# Patient Record
Sex: MALE | Race: WHITE | Employment: OTHER | URBAN - METROPOLITAN AREA
[De-identification: names, ages, dates, MRNs, and addresses within clinical notes are randomized per-mention and may not be internally consistent; named-entity substitution may affect disease eponyms.]

---

## 2017-03-03 ENCOUNTER — APPOINTMENT (OUTPATIENT)
Dept: GENERAL RADIOLOGY | Age: 67
End: 2017-03-03
Attending: INTERNAL MEDICINE
Payer: MEDICARE

## 2017-03-03 ENCOUNTER — HOSPITAL ENCOUNTER (OUTPATIENT)
Dept: NON INVASIVE DIAGNOSTICS | Age: 67
Setting detail: OBSERVATION
Discharge: HOME | End: 2017-03-04
Attending: INTERNAL MEDICINE | Admitting: INTERNAL MEDICINE
Payer: MEDICARE

## 2017-03-03 LAB
ANION GAP BLD CALC-SCNC: 9 MMOL/L (ref 5–15)
BUN SERPL-MCNC: 21 MG/DL (ref 6–20)
BUN/CREAT SERPL: 16 (ref 12–20)
CALCIUM SERPL-MCNC: 8.7 MG/DL (ref 8.5–10.1)
CHLORIDE SERPL-SCNC: 109 MMOL/L (ref 97–108)
CO2 SERPL-SCNC: 25 MMOL/L (ref 21–32)
CREAT SERPL-MCNC: 1.31 MG/DL (ref 0.7–1.3)
ERYTHROCYTE [DISTWIDTH] IN BLOOD BY AUTOMATED COUNT: 12.2 % (ref 11.5–14.5)
GLUCOSE SERPL-MCNC: 89 MG/DL (ref 65–100)
HCT VFR BLD AUTO: 41.4 % (ref 36.6–50.3)
HGB BLD-MCNC: 14.4 G/DL (ref 12.1–17)
MCH RBC QN AUTO: 31.9 PG (ref 26–34)
MCHC RBC AUTO-ENTMCNC: 34.8 G/DL (ref 30–36.5)
MCV RBC AUTO: 91.8 FL (ref 80–99)
PLATELET # BLD AUTO: 236 K/UL (ref 150–400)
POTASSIUM SERPL-SCNC: 3.8 MMOL/L (ref 3.5–5.1)
RBC # BLD AUTO: 4.51 M/UL (ref 4.1–5.7)
SODIUM SERPL-SCNC: 143 MMOL/L (ref 136–145)
WBC # BLD AUTO: 6.4 K/UL (ref 4.1–11.1)

## 2017-03-03 PROCEDURE — 74011000250 HC RX REV CODE- 250

## 2017-03-03 PROCEDURE — 74011250636 HC RX REV CODE- 250/636

## 2017-03-03 PROCEDURE — L3670 SO ACRO/CLAV CAN WEB PRE OTS: HCPCS

## 2017-03-03 PROCEDURE — 33208 INSRT HEART PM ATRIAL & VENT: CPT

## 2017-03-03 PROCEDURE — 77030014450 HC INTRO SHTH ANGI MRTM -B

## 2017-03-03 PROCEDURE — C1785 PMKR, DUAL, RATE-RESP: HCPCS

## 2017-03-03 PROCEDURE — 80048 BASIC METABOLIC PNL TOTAL CA: CPT | Performed by: INTERNAL MEDICINE

## 2017-03-03 PROCEDURE — C1894 INTRO/SHEATH, NON-LASER: HCPCS

## 2017-03-03 PROCEDURE — 77030031139 HC SUT VCRL2 J&J -A

## 2017-03-03 PROCEDURE — 71010 XR CHEST PORT: CPT

## 2017-03-03 PROCEDURE — 99218 HC RM OBSERVATION: CPT

## 2017-03-03 PROCEDURE — 74011636320 HC RX REV CODE- 636/320

## 2017-03-03 PROCEDURE — 77030018729 HC ELECTRD DEFIB PAD CARD -B

## 2017-03-03 PROCEDURE — 74011250636 HC RX REV CODE- 250/636: Performed by: INTERNAL MEDICINE

## 2017-03-03 PROCEDURE — 77030002996 HC SUT SLK J&J -A

## 2017-03-03 PROCEDURE — 36415 COLL VENOUS BLD VENIPUNCTURE: CPT | Performed by: INTERNAL MEDICINE

## 2017-03-03 PROCEDURE — 74011250637 HC RX REV CODE- 250/637: Performed by: INTERNAL MEDICINE

## 2017-03-03 PROCEDURE — C1892 INTRO/SHEATH,FIXED,PEEL-AWAY: HCPCS

## 2017-03-03 PROCEDURE — C1898 LEAD, PMKR, OTHER THAN TRANS: HCPCS

## 2017-03-03 PROCEDURE — 77030018836 HC SOL IRR NACL ICUM -A

## 2017-03-03 PROCEDURE — 77010033678 HC OXYGEN DAILY

## 2017-03-03 PROCEDURE — 85027 COMPLETE CBC AUTOMATED: CPT | Performed by: INTERNAL MEDICINE

## 2017-03-03 RX ORDER — CEFAZOLIN SODIUM IN 0.9 % NACL 2 G/100 ML
2 PLASTIC BAG, INJECTION (ML) INTRAVENOUS EVERY 8 HOURS
Status: COMPLETED | OUTPATIENT
Start: 2017-03-03 | End: 2017-03-04

## 2017-03-03 RX ORDER — ATORVASTATIN CALCIUM 20 MG/1
20 TABLET, FILM COATED ORAL DAILY
Status: DISCONTINUED | OUTPATIENT
Start: 2017-03-04 | End: 2017-03-04 | Stop reason: HOSPADM

## 2017-03-03 RX ORDER — BACITRACIN 50000 [IU]/1
50000 INJECTION, POWDER, FOR SOLUTION INTRAMUSCULAR ONCE
Status: COMPLETED | OUTPATIENT
Start: 2017-03-03 | End: 2017-03-03

## 2017-03-03 RX ORDER — BACITRACIN 50000 [IU]/1
INJECTION, POWDER, FOR SOLUTION INTRAMUSCULAR
Status: COMPLETED
Start: 2017-03-03 | End: 2017-03-03

## 2017-03-03 RX ORDER — DICLOFENAC SODIUM 10 MG/G
GEL TOPICAL 4 TIMES DAILY
COMMUNITY

## 2017-03-03 RX ORDER — ACETAMINOPHEN 325 MG/1
650 TABLET ORAL
Status: DISCONTINUED | OUTPATIENT
Start: 2017-03-03 | End: 2017-03-04 | Stop reason: HOSPADM

## 2017-03-03 RX ORDER — HEPARIN SODIUM 200 [USP'U]/100ML
INJECTION, SOLUTION INTRAVENOUS
Status: COMPLETED
Start: 2017-03-03 | End: 2017-03-03

## 2017-03-03 RX ORDER — AMIODARONE HYDROCHLORIDE 200 MG/1
200 TABLET ORAL 2 TIMES DAILY
Status: DISCONTINUED | OUTPATIENT
Start: 2017-03-03 | End: 2017-03-04 | Stop reason: HOSPADM

## 2017-03-03 RX ORDER — FENTANYL CITRATE 50 UG/ML
12.5-5 INJECTION, SOLUTION INTRAMUSCULAR; INTRAVENOUS
Status: DISCONTINUED | OUTPATIENT
Start: 2017-03-03 | End: 2017-03-03 | Stop reason: ALTCHOICE

## 2017-03-03 RX ORDER — SODIUM CHLORIDE 0.9 % (FLUSH) 0.9 %
5-10 SYRINGE (ML) INJECTION EVERY 8 HOURS
Status: DISCONTINUED | OUTPATIENT
Start: 2017-03-03 | End: 2017-03-04 | Stop reason: HOSPADM

## 2017-03-03 RX ORDER — FENTANYL CITRATE 50 UG/ML
INJECTION, SOLUTION INTRAMUSCULAR; INTRAVENOUS
Status: COMPLETED
Start: 2017-03-03 | End: 2017-03-03

## 2017-03-03 RX ORDER — SODIUM CHLORIDE 9 MG/ML
100 INJECTION, SOLUTION INTRAVENOUS CONTINUOUS
Status: DISCONTINUED | OUTPATIENT
Start: 2017-03-03 | End: 2017-03-04 | Stop reason: HOSPADM

## 2017-03-03 RX ORDER — LIDOCAINE HYDROCHLORIDE AND EPINEPHRINE 10; 10 MG/ML; UG/ML
1-20 INJECTION, SOLUTION INFILTRATION; PERINEURAL
Status: DISCONTINUED | OUTPATIENT
Start: 2017-03-03 | End: 2017-03-03 | Stop reason: ALTCHOICE

## 2017-03-03 RX ORDER — CEPHALEXIN 500 MG/1
500 CAPSULE ORAL 3 TIMES DAILY
Qty: 9 CAP | Refills: 0 | Status: SHIPPED | OUTPATIENT
Start: 2017-03-03 | End: 2017-03-06

## 2017-03-03 RX ORDER — HEPARIN SODIUM 200 [USP'U]/100ML
500 INJECTION, SOLUTION INTRAVENOUS ONCE
Status: COMPLETED | OUTPATIENT
Start: 2017-03-03 | End: 2017-03-03

## 2017-03-03 RX ORDER — VALSARTAN 80 MG/1
160 TABLET ORAL DAILY
Status: DISCONTINUED | OUTPATIENT
Start: 2017-03-04 | End: 2017-03-04 | Stop reason: HOSPADM

## 2017-03-03 RX ORDER — SODIUM CHLORIDE 0.9 % (FLUSH) 0.9 %
5-10 SYRINGE (ML) INJECTION AS NEEDED
Status: DISCONTINUED | OUTPATIENT
Start: 2017-03-03 | End: 2017-03-04 | Stop reason: HOSPADM

## 2017-03-03 RX ORDER — LIDOCAINE HYDROCHLORIDE AND EPINEPHRINE 10; 10 MG/ML; UG/ML
INJECTION, SOLUTION INFILTRATION; PERINEURAL
Status: COMPLETED
Start: 2017-03-03 | End: 2017-03-03

## 2017-03-03 RX ORDER — CEFAZOLIN SODIUM 1 G/3ML
INJECTION, POWDER, FOR SOLUTION INTRAMUSCULAR; INTRAVENOUS
Status: COMPLETED
Start: 2017-03-03 | End: 2017-03-03

## 2017-03-03 RX ORDER — MIDAZOLAM HYDROCHLORIDE 1 MG/ML
INJECTION, SOLUTION INTRAMUSCULAR; INTRAVENOUS
Status: COMPLETED
Start: 2017-03-03 | End: 2017-03-03

## 2017-03-03 RX ORDER — CEFAZOLIN SODIUM IN 0.9 % NACL 2 G/100 ML
2 PLASTIC BAG, INJECTION (ML) INTRAVENOUS ONCE
Status: DISCONTINUED | OUTPATIENT
Start: 2017-03-03 | End: 2017-03-03 | Stop reason: ALTCHOICE

## 2017-03-03 RX ORDER — ASPIRIN 81 MG/1
81 TABLET ORAL DAILY
Status: DISCONTINUED | OUTPATIENT
Start: 2017-03-04 | End: 2017-03-04 | Stop reason: HOSPADM

## 2017-03-03 RX ORDER — MIDAZOLAM HYDROCHLORIDE 1 MG/ML
1-5 INJECTION, SOLUTION INTRAMUSCULAR; INTRAVENOUS
Status: DISCONTINUED | OUTPATIENT
Start: 2017-03-03 | End: 2017-03-03 | Stop reason: ALTCHOICE

## 2017-03-03 RX ADMIN — CEFAZOLIN 2 G: 10 INJECTION, POWDER, FOR SOLUTION INTRAVENOUS; PARENTERAL at 20:54

## 2017-03-03 RX ADMIN — LIDOCAINE HYDROCHLORIDE,EPINEPHRINE BITARTRATE 20 ML: 10; .01 INJECTION, SOLUTION INFILTRATION; PERINEURAL at 12:06

## 2017-03-03 RX ADMIN — SODIUM CHLORIDE 100 ML/HR: 900 INJECTION, SOLUTION INTRAVENOUS at 15:38

## 2017-03-03 RX ADMIN — HEPARIN SODIUM 1000 UNITS: 200 INJECTION, SOLUTION INTRAVENOUS at 10:45

## 2017-03-03 RX ADMIN — BACITRACIN 50000 UNITS: 50000 INJECTION, POWDER, FOR SOLUTION INTRAMUSCULAR at 12:37

## 2017-03-03 RX ADMIN — BACITRACIN 50000 UNITS: 5000 INJECTION, POWDER, FOR SOLUTION INTRAMUSCULAR at 12:37

## 2017-03-03 RX ADMIN — MIDAZOLAM HYDROCHLORIDE 2 MG: 1 INJECTION INTRAMUSCULAR; INTRAVENOUS at 10:50

## 2017-03-03 RX ADMIN — IOPAMIDOL 20 ML: 755 INJECTION, SOLUTION INTRAVENOUS at 10:46

## 2017-03-03 RX ADMIN — MIDAZOLAM HYDROCHLORIDE 2 MG: 1 INJECTION INTRAMUSCULAR; INTRAVENOUS at 12:03

## 2017-03-03 RX ADMIN — SODIUM CHLORIDE 100 ML/HR: 900 INJECTION, SOLUTION INTRAVENOUS at 10:30

## 2017-03-03 RX ADMIN — MIDAZOLAM HYDROCHLORIDE 2 MG: 1 INJECTION INTRAMUSCULAR; INTRAVENOUS at 10:45

## 2017-03-03 RX ADMIN — MIDAZOLAM HYDROCHLORIDE 2 MG: 1 INJECTION INTRAMUSCULAR; INTRAVENOUS at 10:40

## 2017-03-03 RX ADMIN — FENTANYL CITRATE 50 MCG: 50 INJECTION, SOLUTION INTRAMUSCULAR; INTRAVENOUS at 12:01

## 2017-03-03 RX ADMIN — SODIUM CHLORIDE 500 ML: 900 INJECTION, SOLUTION INTRAVENOUS at 10:30

## 2017-03-03 RX ADMIN — Medication 10 ML: at 20:54

## 2017-03-03 RX ADMIN — AMIODARONE HYDROCHLORIDE 200 MG: 200 TABLET ORAL at 17:55

## 2017-03-03 RX ADMIN — CEFAZOLIN SODIUM 2000 MG: 1 INJECTION, POWDER, FOR SOLUTION INTRAMUSCULAR; INTRAVENOUS at 11:45

## 2017-03-03 RX ADMIN — LIDOCAINE HYDROCHLORIDE AND EPINEPHRINE 20 ML: 10; 10 INJECTION, SOLUTION INFILTRATION; PERINEURAL at 12:06

## 2017-03-03 RX ADMIN — MIDAZOLAM HYDROCHLORIDE 2 MG: 1 INJECTION INTRAMUSCULAR; INTRAVENOUS at 11:30

## 2017-03-03 RX ADMIN — FENTANYL CITRATE 50 MCG: 50 INJECTION, SOLUTION INTRAMUSCULAR; INTRAVENOUS at 11:30

## 2017-03-03 NOTE — IP AVS SNAPSHOT
Current Discharge Medication List  
  
Take these medications at their scheduled times Dose & Instructions Dispensing Information Comments Morning Noon Evening Bedtime  
 amiodarone 200 mg tablet Commonly known as:  CORDARONE Your next dose is: Today, Tomorrow Other:  ____________ Take  by mouth daily. Refills:  0  
     
   
   
   
  
 aspirin, buffered 81 mg Tab Your next dose is: Today, Tomorrow Other:  ____________ Take  by mouth daily. Refills:  0  
     
   
   
   
  
 atorvastatin 20 mg tablet Commonly known as:  LIPITOR Your next dose is: Today, Tomorrow Other:  ____________ Take  by mouth daily. Refills:  0  
     
   
   
   
  
 cephALEXin 500 mg capsule Commonly known as:  Dene Decree Your next dose is: Today, Tomorrow Other:  ____________ Dose:  500 mg Take 1 Cap by mouth three (3) times daily for 3 days. Quantity:  9 Cap Refills:  0  
     
   
   
   
  
 hydroCHLOROthiazide 12.5 mg tablet Commonly known as:  HYDRODIURIL Your next dose is: Today, Tomorrow Other:  ____________ Dose:  12.5 mg Take 12.5 mg by mouth daily. Refills:  0  
     
   
   
   
  
 losartan 100 mg tablet Commonly known as:  COZAAR Your next dose is: Today, Tomorrow Other:  ____________ Dose:  50 mg Take 50 mg by mouth two (2) times a day. Refills:  0 VOLTAREN 1 % Gel Generic drug:  diclofenac Your next dose is: Today, Tomorrow Other:  ____________ Apply  to affected area four (4) times daily. Refills:  0 Take these medications as directed Dose & Instructions Dispensing Information Comments Morning Noon Evening Bedtime FISH -160-1,000 mg Cap Generic drug:  omega 3-dha-epa-fish oil Your next dose is: Today, Tomorrow Other:  ____________ Take  by mouth. Refills:  0 Where to Get Your Medications Information about where to get these medications is not yet available ! Ask your nurse or doctor about these medications  
  cephALEXin 500 mg capsule

## 2017-03-03 NOTE — IP AVS SNAPSHOT
[facility] 
[phone] Patient: Anamika Yeung MRN: [id number] :1950 You are allergic to the following No active allergies Recent Documentation Height Weight BMI Smoking Status 1.778 m 86.2 kg 27.26 kg/m2 Never Smoker Emergency Contacts Name Discharge Info Relation Home Work Mobile Alisia Last DISCHARGE CAREGIVER [3] Spouse [3] [phone] About your hospitalization You were admitted on:  March 3, 2017 You last received care in the:  [facility] You were discharged on:  2017 Unit phone number:  [phone] Why you were hospitalized Your primary diagnosis was:  Not on File Providers Seen During Your Hospitalizations Provider Role Specialty Primary office phone Parvez Marshall MD Attending Provider Cardiology [phone] Your Primary Care Physician (PCP) Primary Care Physician Office Phone Office Fax [employer] [phone] Follow-up Information Follow up With Details Comments Contact Info Teresita Hogan MD   [address] [place] [address] 
[phone] Current Discharge Medication List  
  
START taking these medications Dose & Instructions Dispensing Information Comments Morning Noon Evening Bedtime  
 cephALEXin 500 mg capsule Commonly known as:  Lise Hides Your next dose is: Today, Tomorrow Other:  _________ Dose:  500 mg Take 1 Cap by mouth three (3) times daily for 3 days. Quantity:  9 Cap Refills:  0 CONTINUE these medications which have NOT CHANGED Dose & Instructions Dispensing Information Comments Morning Noon Evening Bedtime  
 amiodarone 200 mg tablet Commonly known as:  CORDARONE  
   
 Your next dose is: Today, Tomorrow Other:  _________ Take  by mouth daily. Refills:  0  
     
   
   
   
  
 aspirin, buffered 81 mg Tab Your next dose is: Today, Tomorrow Other:  _________ Take  by mouth daily. Refills:  0  
     
   
   
   
  
 atorvastatin 20 mg tablet Commonly known as:  LIPITOR Your next dose is: Today, Tomorrow Other:  _________ Take  by mouth daily. Refills:  0  
     
   
   
   
  
 FISH -160-1,000 mg Cap Generic drug:  omega 3-dha-epa-fish oil Your next dose is: Today, Tomorrow Other:  _________ Take  by mouth. Refills:  0  
     
   
   
   
  
 hydroCHLOROthiazide 12.5 mg tablet Commonly known as:  HYDRODIURIL Your next dose is: Today, Tomorrow Other:  _________ Dose:  12.5 mg Take 12.5 mg by mouth daily. Refills:  0  
     
   
   
   
  
 losartan 100 mg tablet Commonly known as:  COZAAR Your next dose is: Today, Tomorrow Other:  _________ Dose:  50 mg Take 50 mg by mouth two (2) times a day. Refills:  0 VOLTAREN 1 % Gel Generic drug:  diclofenac Your next dose is: Today, Tomorrow Other:  _________ Apply  to affected area four (4) times daily. Refills:  0 Where to Get Your Medications Information on where to get these meds will be given to you by the nurse or doctor. ! Ask your nurse or doctor about these medications  
  cephALEXin 500 mg capsule Discharge Instructions Cardiology Discharge Summary Patient ID: 
Anamika Yeung [id number] 
47 y.o. 
1950 Admit Date: 3/3/2017 Discharge Date: 3/3/2017 Admitting Physician: Parvez Marshall MD  
 
 
 
Patient Instructions:  
 
 
 
Referenced discharge instructions provided by nursing for diet and activity. Follow-up with Dr Justino Mirza in 10 days for pacer check [phone] Signed: 
Romana Cosier, MD 
3/3/2017 
7:21 PM 
DISCHARGE INSTRUCTIONS FOR PATIENTS WITH PACEMAKERS 1. Remember to call for an appointment in 2-4 weeks [phone] to check healing and implant programming. 2. Medic Alert Bracelets are available from your pharmacist to wear at all times if you choose to wear one. 3. Carry your ID card for pacemaker with you at all times. This card will be given to you in the hospital or mailed to you. 4. The pacemaker will bulge slightly under your skin. The bulge will decrease in size over the next few weeks. Please notify the doctor's office if you notice any of the following around your site: A.  A bruise that does not go away. B.  Soreness or yellow, green, or brown drainage from the site. C. Any swelling from the site. D. If you have a fever of 100 degrees or higher that lasts for a few days. INCISION CARE 1.  Leave Steristrips over your site until it starts to fall off, usually in a few weeks. 2.  You may shower after 3 days as long as your incision isnt submerged or directly sprayed upon until well healed. 3.  For comfort, wear loose fitting clothing. 4.  Report any signs of infection, fever, pain, swelling, redness, oozing, or heat at site especially if these symptoms increase after the first 3 to 4 days. ACTIVITY PRECAUTIONS 1. Avoid rough contact with the implant site. 2. No driving for 14 days. 3. Avoid lifting your arm over your head, carrying anything on the affected side, or lifting over 10 pounds for 30 days. For the first 2 days only bend your arm at the elbow. 4. Any extreme activity such as golf, weight lifting or exercise biking should be restricted for 60 days. 5. Do not carry objects by holding them against your implant site. 6.  No shooting rifles or any type of gun with the affected shoulder permanently. SPECIAL PRECAUTIONS 1.  You should avoid all strong magnetic fields, such as arc welding, large transformers, large motors. 2.  You may not have an MRI which uses a strong magnet to take pictures. 3.  Treatments or surgery that requires diathermy or electrocautery should be discussed with your doctor before scheduled. 4. Avoid radio frequency transmitters, including radar. 5. Advise dentist or other medical personnel you see that you have a pacemaker. 6.  Cell phones and microwave oven use is okay. 7.  If you plan to move or take a trip to a new area, the doctor's office will give you a name of a doctor to contact for any problems. ANTIBIOTIC THERAPY During the first 8 weeks after your pacemaker insertion, you may need antibiotics before any dental work or certain tests or operations. Let the dentist or doctor who is caring for you know that you have had an implanted device. You will be given a prescription for a prophylactic antibiotic called cephalexin to take for a few days after your procedure. Discharge Orders None Introducing [facility]! Hector Leblanc introduces [facility] patient portal. Now you can access parts of your medical record, email your doctor's office, and request medication refills online. 1. In your internet browser, go to [email or web address] 2. Click on the First Time User? Click Here link in the Sign In box. You will see the New Member Sign Up page. 3. Enter your [facility] Access Code exactly as it appears below. You will not need to use this code after youve completed the sign-up process. If you do not sign up before the expiration date, you must request a new code. · [facility] Access Code: [email or web address] Expires: 5/31/2017 12:01 PM 
 
4. Enter the last four digits of your Social Security Number (xxxx) and Date of Birth (mm/dd/yyyy) as indicated and click Submit. You will be taken to the next sign-up page. 5. Create a [employer] ID. This will be your [employer] login ID and cannot be changed, so think of one that is secure and easy to remember. 6. Create a [employer] password. You can change your password at any time. 7. Enter your Password Reset Question and Answer. This can be used at a later time if you forget your password. 8. Enter your e-mail address. You will receive e-mail notification when new information is available in [address] 9. Click Sign Up. You can now view and download portions of your medical record. 10. Click the Download Summary menu link to download a portable copy of your medical information. If you have questions, please visit the Frequently Asked Questions section of the [employer] website. Remember, [employer] is NOT to be used for urgent needs. For medical emergencies, dial 911. Now available from your iPhone and Android! General Information Please provide this summary of care documentation to your next provider. Patient Signature:  ____________________________________________________________ Date:  ____________________________________________________________  
  
Jairon Geronimo Provider Signature:  ____________________________________________________________ Date:  ____________________________________________________________

## 2017-03-03 NOTE — PROGRESS NOTES
EP/ End of Procedure/ TRANSFER - OUT REPORT:    Verbal report given to LPM,RN on Anamika Given for routine progression of care       Report consisted of patients Situation, Background, Assessment and   Recommendations(SBAR). Information from the following report(s) SBAR, Kardex, Procedure Summary and MAR was reviewed with the receiving nurse. Opportunity for questions and clarification was provided.

## 2017-03-03 NOTE — PROGRESS NOTES
Cardiac Cath Lab Recovery Arrival Note:      Amy Brewer arrived to Cardiac Cath Lab, Recovery Area. Staff introduced to patient. Patient identifiers verified with NAME and DATE OF BIRTH. Procedure verified with patient. Consent forms reviewed and signed by patient or authorized representative and verified. Allergies verified. Patient and family oriented to department. Patient and family informed of procedure and plan of care. Questions answered with review. Patient prepped for procedure, per orders from physician, prior to arrival.    Patient on cardiac monitor, non-invasive blood pressure, SPO2 monitor. On room air. Patient is A&Ox 3. Patient reports no complaints. Patient in stretcher, in low position, with side rails up, call bell within reach, patient instructed to call if assistance as needed. Patient prep in: [address], [place]. Family in: waiting room.    Prep by: Mer Eller RN

## 2017-03-04 VITALS
SYSTOLIC BLOOD PRESSURE: 145 MMHG | DIASTOLIC BLOOD PRESSURE: 79 MMHG | HEIGHT: 70 IN | WEIGHT: 190 LBS | BODY MASS INDEX: 27.2 KG/M2 | TEMPERATURE: 97.9 F | RESPIRATION RATE: 11 BRPM | OXYGEN SATURATION: 97 % | HEART RATE: 60 BPM

## 2017-03-04 PROCEDURE — 74011250636 HC RX REV CODE- 250/636: Performed by: INTERNAL MEDICINE

## 2017-03-04 PROCEDURE — 93005 ELECTROCARDIOGRAM TRACING: CPT

## 2017-03-04 PROCEDURE — 74011250637 HC RX REV CODE- 250/637: Performed by: INTERNAL MEDICINE

## 2017-03-04 PROCEDURE — 99218 HC RM OBSERVATION: CPT

## 2017-03-04 RX ADMIN — Medication 10 ML: at 05:01

## 2017-03-04 RX ADMIN — ATORVASTATIN CALCIUM 20 MG: 20 TABLET, FILM COATED ORAL at 08:28

## 2017-03-04 RX ADMIN — ACETAMINOPHEN 650 MG: 325 TABLET, FILM COATED ORAL at 02:35

## 2017-03-04 RX ADMIN — VALSARTAN 160 MG: 80 TABLET ORAL at 08:28

## 2017-03-04 RX ADMIN — CEFAZOLIN 2 G: 10 INJECTION, POWDER, FOR SOLUTION INTRAVENOUS; PARENTERAL at 05:01

## 2017-03-04 RX ADMIN — AMIODARONE HYDROCHLORIDE 200 MG: 200 TABLET ORAL at 08:29

## 2017-03-04 RX ADMIN — ASPIRIN 81 MG: 81 TABLET, COATED ORAL at 08:29

## 2017-03-04 NOTE — PROGRESS NOTES
2000  Pt up and ambulating in the reyes with family. Left arm sling intact. Denies pain at this time. 2015 Assessment completed as charted. Ice bag to left chest pacer site, small amount old blood noted on dressing. 0000  Resting quietly in bed, VSS.  0430 VSS, no acute change in assessment.   0700 VSS, pt up in chair, denies pain or discomfort

## 2017-03-04 NOTE — PROCEDURES
[address]       Name:  Atiya Carrion   MR#:  [id number]   :  1950   Account #:  [de-identified]        Date of Adm:  2017       PROCEDURES PERFORMED:   1. Left subclavian venogram.   2. Dual-chamber permanent pacemaker placement. SURGEON: Kyree Gutierrez MD    ANESTHESIA:  Local with concious sedation     INDICATION: Nonreversible symptomatic sick sinus syndrome and AV   node disease. DESCRIPTION OF PROCEDURE: The patient was brought to the EP   lab in a fasting state and after informed consent was obtained. Electrocardiographic and hemodynamic monitoring were performed. Sedation was performed by the nurse who was in constant attendance   throughout the procedure. The patient received 10 mg of IV Versed   and 100 mcg of IV fentanyl for sedation. The attending physician   constant was present during the entire procedure and supervised   sedation closely, which was administered from 10:30 a.m. until 12:30   p.m. Ancef, 2 grams IV was given prior to the procedure. Lidocaine   1% with epinephrine was used to anesthetize the left chest wall implant   site. The pocket was formed in the usual fashion venous access was   obtained using a micropuncture needle. A 9-Latvian stay sheath was   placed in the left subclavian vein. A guidewire was retained within the   sheath and the right ventricular lead was subsequently advanced to   the right ventricular apex under fluoroscopic guidance. After   appropriate parameters were obtained, the lead was screwed in place   in the usual fashion. This was a [employer] model 7742,   59 cm lead, serial [id number]. Following this, the 9-Latvian sheath was   removed and the guidewire was retained.  A 7-Latvian sheath was   placed over the retained guidewire and after removal of the guidewire,   the right atrial lead was advanced to the area of the right atrial   appendage. This was a [employer] model 7741, 52 cm   lead, serial [id number]. Parameters obtained for the ventricular lead   included an R-wave of 14.3 millivolts, impedance of 952 ohms and   pacing threshold of 0.5 volts at 0.5 milliseconds pulse width. For the   atrial lead, parameters revealed a P-wave of 4.3 millivolts, impedance   of 634 ohms and pacing threshold of 0.3 volts at 0.5 milliseconds pulse   width. Following this, the leads were anchored to the pocket floor using   2-0 silk sutures at each anchor sleeve. The pulse generator was then   connected to the leads and placed in the pocket after hemostasis was   confirmed. This was a [employer]   pacemaker, model L33-1, serial [id number]. Vigorous irrigation with   antibiotic solution was then performed in the pacemaker pocket and   the pocket was closed using 2 running 2-0 Vicryl layers with a more   superficial layer of running 4-0 Vicryl in a subcuticular fashion. Final   fluoroscopic check revealed adequate redundancy of the leads and   absence of a pneumothorax. Final settings were in the DDDR mode   with a lower rate limit of 60 and a maximum tracking rate of 130 beats   per minute. PLAN: The patient was to have a followup portable chest x-ray   immediately post-procedure and a pacemaker check the morning after   the procedure. The patient was also to receive IV Keflex post-  procedure and post-discharge followup was to be in approximately 10   days for a routine wound and device check.         MD Andi Chavez   D:  03/03/2017   19:41   T:  03/04/2017   10:40   Job #:  [id number]

## 2017-03-04 NOTE — ROUTINE PROCESS
I have reviewed discharge instructions with the patient. The patient verbalized understanding. The patient's prescriptions were provided and IV was removed, the patient had no questions regarding new medication and/or pacemaker instructions/precautions.

## 2017-03-04 NOTE — CARDIO/PULMONARY
C/P Rehab Note:    Chart Reviewed. Pt admitted for Dual Chamber PPM,(3/3/17). PMH significant for:  1. CAD  2. HTN  Pt is a non smoker. Met with pt who was sitting up in bed, wife at his side. Printed material given and discussed re: pacemakers, pacemaker discharge instructions and the [place] diet. Discussed post pacemaker instructions including: restrictions for the affected arm (no raising the arm above shoulder level, no heavy lifting for 30 days), monitoring for infection, avoiding impacts/pressure to the site, avoiding extreme activities, when to call the doctor, use of cell phones and microwaves and avoiding strong magnetic anderson. Pt and wife  verbalized understanding and without questions.

## 2017-03-04 NOTE — DISCHARGE INSTRUCTIONS
Cardiology Discharge Summary     Patient ID:  Magalie Osborne  [id number]  12 y.o.  1950    Admit Date: 3/3/2017    Discharge Date: 3/3/2017     Admitting Physician: Jae Reid MD         Patient Instructions:         Referenced discharge instructions provided by nursing for diet and activity. Follow-up with Dr Jocy Person in 10 days for pacer check [phone]     Signed:  Jae Reid MD  3/3/2017  7:21 PM  DISCHARGE INSTRUCTIONS FOR PATIENTS WITH PACEMAKERS    1. Remember to call for an appointment in 2-4 weeks [phone] to check healing and implant programming. 2. Medic Alert Bracelets are available from your pharmacist to wear at all times if you choose to wear one. 3. Carry your ID card for pacemaker with you at all times. This card will be given to you in the hospital or mailed to you. 4. The pacemaker will bulge slightly under your skin. The bulge will decrease in size over the next few weeks. Please notify the doctor's office if you notice any of the following around your site:   A.  A bruise that does not go away. B.  Soreness or yellow, green, or brown drainage from the site. C. Any swelling from the site. D. If you have a fever of 100 degrees or higher that lasts for a few days. INCISION CARE       1.  Leave Steristrips over your site until it starts to fall off, usually in a few weeks. 2.  You may shower after 3 days as long as your incision isnt submerged or directly sprayed upon until well healed. 3.  For comfort, wear loose fitting clothing. 4.  Report any signs of infection, fever, pain, swelling, redness, oozing, or heat at site especially if these symptoms increase after the first 3 to 4 days. ACTIVITY PRECAUTIONS     1. Avoid rough contact with the implant site. 2. No driving for 14 days. 3. Avoid lifting your arm over your head, carrying anything on the affected side, or lifting over 10 pounds for 30 days.   For the first 2 days only bend your arm at the elbow. 4. Any extreme activity such as golf, weight lifting or exercise biking should be restricted for 60 days. 5. Do not carry objects by holding them against your implant site. 6.  No shooting rifles or any type of gun with the affected shoulder permanently. SPECIAL PRECAUTIONS     1. You should avoid all strong magnetic fields, such as arc welding, large transformers, large motors. 2.  You may not have an MRI which uses a strong magnet to take pictures. 3.  Treatments or surgery that requires diathermy or electrocautery should be discussed with your doctor before scheduled. 4. Avoid radio frequency transmitters, including radar. 5. Advise dentist or other medical personnel you see that you have a pacemaker. 6.  Cell phones and microwave oven use is okay. 7.  If you plan to move or take a trip to a new area, the doctor's office will give you a name of a doctor to contact for any problems. ANTIBIOTIC THERAPY    During the first 8 weeks after your pacemaker insertion, you may need antibiotics before any dental work or certain tests or operations. Let the dentist or doctor who is caring for you know that you have had an implanted device. You will be given a prescription for a prophylactic antibiotic called cephalexin to take for a few days after your procedure.

## 2017-03-05 LAB
ATRIAL RATE: 62 BPM
CALCULATED P AXIS, ECG09: 63 DEGREES
CALCULATED R AXIS, ECG10: -32 DEGREES
CALCULATED T AXIS, ECG11: 59 DEGREES
DIAGNOSIS, 93000: NORMAL
P-R INTERVAL, ECG05: 196 MS
Q-T INTERVAL, ECG07: 482 MS
QRS DURATION, ECG06: 142 MS
QTC CALCULATION (BEZET), ECG08: 489 MS
VENTRICULAR RATE, ECG03: 62 BPM

## 2020-08-28 ENCOUNTER — HOSPITAL ENCOUNTER (INPATIENT)
Age: 70
LOS: 4 days | Discharge: HOME | DRG: 250 | End: 2020-09-01
Attending: EMERGENCY MEDICINE | Admitting: HOSPITALIST
Payer: MEDICARE

## 2020-08-28 DIAGNOSIS — I20.0 UNSTABLE ANGINA (HCC): Primary | ICD-10-CM

## 2020-08-28 DIAGNOSIS — R07.9 CHEST PAIN, UNSPECIFIED TYPE: ICD-10-CM

## 2020-08-28 LAB
ACT BLD: 340 SECS (ref 79–138)
ACT BLD: 500 SECS (ref 79–138)
ANION GAP SERPL CALC-SCNC: 3 MMOL/L (ref 5–15)
APTT PPP: 31.1 SEC (ref 22.1–32)
ATRIAL RATE: 60 BPM
BUN SERPL-MCNC: 25 MG/DL (ref 6–20)
BUN/CREAT SERPL: 16 (ref 12–20)
CALCIUM SERPL-MCNC: 8 MG/DL (ref 8.5–10.1)
CALCULATED P AXIS, ECG09: 57 DEGREES
CALCULATED R AXIS, ECG10: -13 DEGREES
CALCULATED T AXIS, ECG11: 32 DEGREES
CHLORIDE SERPL-SCNC: 110 MMOL/L (ref 97–108)
CHOLEST SERPL-MCNC: 132 MG/DL
CO2 SERPL-SCNC: 25 MMOL/L (ref 21–32)
CREAT SERPL-MCNC: 1.59 MG/DL (ref 0.7–1.3)
DIAGNOSIS, 93000: NORMAL
ERYTHROCYTE [DISTWIDTH] IN BLOOD BY AUTOMATED COUNT: 12.6 % (ref 11.5–14.5)
EST. AVERAGE GLUCOSE BLD GHB EST-MCNC: 111 MG/DL
GLUCOSE SERPL-MCNC: 147 MG/DL (ref 65–100)
HBA1C MFR BLD: 5.5 % (ref 4–5.6)
HCT VFR BLD AUTO: 37.9 % (ref 36.6–50.3)
HDLC SERPL-MCNC: 51 MG/DL
HDLC SERPL: 2.6 {RATIO} (ref 0–5)
HGB BLD-MCNC: 12.6 G/DL (ref 12.1–17)
INR PPP: 1.1 (ref 0.9–1.1)
LDLC SERPL CALC-MCNC: 71.4 MG/DL (ref 0–100)
LIPID PROFILE,FLP: NORMAL
MCH RBC QN AUTO: 32.1 PG (ref 26–34)
MCHC RBC AUTO-ENTMCNC: 33.2 G/DL (ref 30–36.5)
MCV RBC AUTO: 96.7 FL (ref 80–99)
NRBC # BLD: 0 K/UL (ref 0–0.01)
NRBC BLD-RTO: 0 PER 100 WBC
P-R INTERVAL, ECG05: 230 MS
PLATELET # BLD AUTO: 173 K/UL (ref 150–400)
PMV BLD AUTO: 10.7 FL (ref 8.9–12.9)
POTASSIUM SERPL-SCNC: 3.9 MMOL/L (ref 3.5–5.1)
PROTHROMBIN TIME: 11.7 SEC (ref 9–11.1)
Q-T INTERVAL, ECG07: 506 MS
QRS DURATION, ECG06: 152 MS
QTC CALCULATION (BEZET), ECG08: 506 MS
RBC # BLD AUTO: 3.92 M/UL (ref 4.1–5.7)
SODIUM SERPL-SCNC: 138 MMOL/L (ref 136–145)
THERAPEUTIC RANGE,PTTT: NORMAL SECS (ref 58–77)
TRIGL SERPL-MCNC: 48 MG/DL (ref ?–150)
TROPONIN I SERPL-MCNC: 13.3 NG/ML
VENTRICULAR RATE, ECG03: 60 BPM
VLDLC SERPL CALC-MCNC: 9.6 MG/DL
WBC # BLD AUTO: 6.3 K/UL (ref 4.1–11.1)

## 2020-08-28 PROCEDURE — 83036 HEMOGLOBIN GLYCOSYLATED A1C: CPT

## 2020-08-28 PROCEDURE — 74011250637 HC RX REV CODE- 250/637: Performed by: INTERNAL MEDICINE

## 2020-08-28 PROCEDURE — 74011250636 HC RX REV CODE- 250/636: Performed by: INTERNAL MEDICINE

## 2020-08-28 PROCEDURE — 84484 ASSAY OF TROPONIN QUANT: CPT

## 2020-08-28 PROCEDURE — B2151ZZ FLUOROSCOPY OF LEFT HEART USING LOW OSMOLAR CONTRAST: ICD-10-PCS | Performed by: INTERNAL MEDICINE

## 2020-08-28 PROCEDURE — 74011000636 HC RX REV CODE- 636: Performed by: INTERNAL MEDICINE

## 2020-08-28 PROCEDURE — C1757 CATH, THROMBECTOMY/EMBOLECT: HCPCS | Performed by: INTERNAL MEDICINE

## 2020-08-28 PROCEDURE — 85610 PROTHROMBIN TIME: CPT

## 2020-08-28 PROCEDURE — C1884 EMBOLIZATION PROTECT SYST: HCPCS | Performed by: INTERNAL MEDICINE

## 2020-08-28 PROCEDURE — 77030000299 HC FEMSTP COMP GLD STJU -B: Performed by: INTERNAL MEDICINE

## 2020-08-28 PROCEDURE — 77030016704 HC CATH ANGI DX PRF1 MRTM -B: Performed by: INTERNAL MEDICINE

## 2020-08-28 PROCEDURE — C1769 GUIDE WIRE: HCPCS | Performed by: INTERNAL MEDICINE

## 2020-08-28 PROCEDURE — 85347 COAGULATION TIME ACTIVATED: CPT

## 2020-08-28 PROCEDURE — C1894 INTRO/SHEATH, NON-LASER: HCPCS | Performed by: INTERNAL MEDICINE

## 2020-08-28 PROCEDURE — 74011000258 HC RX REV CODE- 258: Performed by: INTERNAL MEDICINE

## 2020-08-28 PROCEDURE — 77030028837 HC SYR ANGI PWR INJ COEU -A: Performed by: INTERNAL MEDICINE

## 2020-08-28 PROCEDURE — 92937 PRQ TRLUML REVSC CAB GRF 1: CPT | Performed by: INTERNAL MEDICINE

## 2020-08-28 PROCEDURE — 93459 L HRT ART/GRFT ANGIO: CPT | Performed by: INTERNAL MEDICINE

## 2020-08-28 PROCEDURE — 74011000250 HC RX REV CODE- 250: Performed by: INTERNAL MEDICINE

## 2020-08-28 PROCEDURE — C1760 CLOSURE DEV, VASC: HCPCS | Performed by: INTERNAL MEDICINE

## 2020-08-28 PROCEDURE — 99152 MOD SED SAME PHYS/QHP 5/>YRS: CPT | Performed by: INTERNAL MEDICINE

## 2020-08-28 PROCEDURE — 93005 ELECTROCARDIOGRAM TRACING: CPT

## 2020-08-28 PROCEDURE — 99284 EMERGENCY DEPT VISIT MOD MDM: CPT

## 2020-08-28 PROCEDURE — 65660000000 HC RM CCU STEPDOWN

## 2020-08-28 PROCEDURE — 80061 LIPID PANEL: CPT

## 2020-08-28 PROCEDURE — 74011250636 HC RX REV CODE- 250/636: Performed by: STUDENT IN AN ORGANIZED HEALTH CARE EDUCATION/TRAINING PROGRAM

## 2020-08-28 PROCEDURE — 99285 EMERGENCY DEPT VISIT HI MDM: CPT

## 2020-08-28 PROCEDURE — 85730 THROMBOPLASTIN TIME PARTIAL: CPT

## 2020-08-28 PROCEDURE — 74011250636 HC RX REV CODE- 250/636: Performed by: HOSPITALIST

## 2020-08-28 PROCEDURE — 99153 MOD SED SAME PHYS/QHP EA: CPT | Performed by: INTERNAL MEDICINE

## 2020-08-28 PROCEDURE — 77030019697 HC SYR ANGI INFL MRTM -B: Performed by: INTERNAL MEDICINE

## 2020-08-28 PROCEDURE — 74011250637 HC RX REV CODE- 250/637: Performed by: HOSPITALIST

## 2020-08-28 PROCEDURE — B2131ZZ FLUOROSCOPY OF MULTIPLE CORONARY ARTERY BYPASS GRAFTS USING LOW OSMOLAR CONTRAST: ICD-10-PCS | Performed by: INTERNAL MEDICINE

## 2020-08-28 PROCEDURE — 85027 COMPLETE CBC AUTOMATED: CPT

## 2020-08-28 PROCEDURE — B2111ZZ FLUOROSCOPY OF MULTIPLE CORONARY ARTERIES USING LOW OSMOLAR CONTRAST: ICD-10-PCS | Performed by: INTERNAL MEDICINE

## 2020-08-28 PROCEDURE — C1887 CATHETER, GUIDING: HCPCS | Performed by: INTERNAL MEDICINE

## 2020-08-28 PROCEDURE — 36415 COLL VENOUS BLD VENIPUNCTURE: CPT

## 2020-08-28 PROCEDURE — 02C13ZZ EXTIRPATION OF MATTER FROM CORONARY ARTERY, TWO ARTERIES, PERCUTANEOUS APPROACH: ICD-10-PCS | Performed by: INTERNAL MEDICINE

## 2020-08-28 PROCEDURE — 4A023N7 MEASUREMENT OF CARDIAC SAMPLING AND PRESSURE, LEFT HEART, PERCUTANEOUS APPROACH: ICD-10-PCS | Performed by: INTERNAL MEDICINE

## 2020-08-28 PROCEDURE — 77030037392 HC CANN PUMP/FLTR INDIGO PENU -E: Performed by: INTERNAL MEDICINE

## 2020-08-28 PROCEDURE — 77030029065 HC DRSG HEMO QCLOT ZMED -B: Performed by: INTERNAL MEDICINE

## 2020-08-28 PROCEDURE — 80048 BASIC METABOLIC PNL TOTAL CA: CPT

## 2020-08-28 RX ORDER — MORPHINE SULFATE 2 MG/ML
2 INJECTION, SOLUTION INTRAMUSCULAR; INTRAVENOUS
Status: DISCONTINUED | OUTPATIENT
Start: 2020-08-28 | End: 2020-09-01 | Stop reason: HOSPADM

## 2020-08-28 RX ORDER — EPTIFIBATIDE 0.75 MG/ML
INJECTION, SOLUTION INTRAVENOUS
Status: COMPLETED | OUTPATIENT
Start: 2020-08-28 | End: 2020-08-28

## 2020-08-28 RX ORDER — HEPARIN SODIUM 5000 [USP'U]/ML
2000 INJECTION, SOLUTION INTRAVENOUS; SUBCUTANEOUS AS NEEDED
Status: DISCONTINUED | OUTPATIENT
Start: 2020-08-28 | End: 2020-09-01 | Stop reason: ALTCHOICE

## 2020-08-28 RX ORDER — MIDAZOLAM HYDROCHLORIDE 1 MG/ML
INJECTION, SOLUTION INTRAMUSCULAR; INTRAVENOUS AS NEEDED
Status: DISCONTINUED | OUTPATIENT
Start: 2020-08-28 | End: 2020-08-28 | Stop reason: HOSPADM

## 2020-08-28 RX ORDER — LIDOCAINE HYDROCHLORIDE 10 MG/ML
INJECTION, SOLUTION EPIDURAL; INFILTRATION; INTRACAUDAL; PERINEURAL AS NEEDED
Status: DISCONTINUED | OUTPATIENT
Start: 2020-08-28 | End: 2020-08-28 | Stop reason: HOSPADM

## 2020-08-28 RX ORDER — HEPARIN SODIUM 10000 [USP'U]/100ML
12-25 INJECTION, SOLUTION INTRAVENOUS
Status: DISCONTINUED | OUTPATIENT
Start: 2020-08-28 | End: 2020-08-31

## 2020-08-28 RX ORDER — NITROGLYCERIN 20 MG/100ML
INJECTION INTRAVENOUS
Status: COMPLETED | OUTPATIENT
Start: 2020-08-28 | End: 2020-08-28

## 2020-08-28 RX ORDER — FENTANYL CITRATE 50 UG/ML
INJECTION, SOLUTION INTRAMUSCULAR; INTRAVENOUS AS NEEDED
Status: DISCONTINUED | OUTPATIENT
Start: 2020-08-28 | End: 2020-08-28 | Stop reason: HOSPADM

## 2020-08-28 RX ORDER — ACETAMINOPHEN 325 MG/1
650 TABLET ORAL
Status: DISCONTINUED | OUTPATIENT
Start: 2020-08-28 | End: 2020-09-01 | Stop reason: HOSPADM

## 2020-08-28 RX ORDER — NITROGLYCERIN 20 MG/100ML
20 INJECTION INTRAVENOUS
Status: DISCONTINUED | OUTPATIENT
Start: 2020-08-28 | End: 2020-08-28

## 2020-08-28 RX ORDER — ATORVASTATIN CALCIUM 40 MG/1
40 TABLET, FILM COATED ORAL
Status: DISCONTINUED | OUTPATIENT
Start: 2020-08-28 | End: 2020-09-01 | Stop reason: HOSPADM

## 2020-08-28 RX ORDER — MORPHINE SULFATE 2 MG/ML
4 INJECTION, SOLUTION INTRAMUSCULAR; INTRAVENOUS
Status: DISCONTINUED | OUTPATIENT
Start: 2020-08-28 | End: 2020-08-28

## 2020-08-28 RX ORDER — HEPARIN SODIUM 10000 [USP'U]/100ML
12-25 INJECTION, SOLUTION INTRAVENOUS
Status: DISCONTINUED | OUTPATIENT
Start: 2020-08-28 | End: 2020-08-28

## 2020-08-28 RX ORDER — AMLODIPINE BESYLATE 10 MG/1
TABLET ORAL DAILY
COMMUNITY
End: 2020-09-01

## 2020-08-28 RX ORDER — ONDANSETRON 2 MG/ML
4 INJECTION INTRAMUSCULAR; INTRAVENOUS
Status: DISCONTINUED | OUTPATIENT
Start: 2020-08-28 | End: 2020-09-01 | Stop reason: HOSPADM

## 2020-08-28 RX ORDER — SODIUM CHLORIDE 9 MG/ML
125 INJECTION, SOLUTION INTRAVENOUS CONTINUOUS
Status: DISCONTINUED | OUTPATIENT
Start: 2020-08-28 | End: 2020-08-28

## 2020-08-28 RX ORDER — HEPARIN SODIUM 5000 [USP'U]/ML
2000 INJECTION, SOLUTION INTRAVENOUS; SUBCUTANEOUS AS NEEDED
Status: DISCONTINUED | OUTPATIENT
Start: 2020-08-28 | End: 2020-08-28

## 2020-08-28 RX ORDER — SODIUM CHLORIDE 0.9 % (FLUSH) 0.9 %
5-40 SYRINGE (ML) INJECTION EVERY 8 HOURS
Status: DISCONTINUED | OUTPATIENT
Start: 2020-08-28 | End: 2020-09-01 | Stop reason: HOSPADM

## 2020-08-28 RX ORDER — HEPARIN SODIUM 5000 [USP'U]/ML
4000 INJECTION, SOLUTION INTRAVENOUS; SUBCUTANEOUS ONCE
Status: DISCONTINUED | OUTPATIENT
Start: 2020-08-28 | End: 2020-08-28

## 2020-08-28 RX ORDER — ACETAMINOPHEN 325 MG/1
650 TABLET ORAL
Status: DISCONTINUED | OUTPATIENT
Start: 2020-08-28 | End: 2020-08-28 | Stop reason: SDUPTHER

## 2020-08-28 RX ORDER — NITROGLYCERIN 20 MG/100ML
20 INJECTION INTRAVENOUS CONTINUOUS
Status: DISCONTINUED | OUTPATIENT
Start: 2020-08-28 | End: 2020-08-31

## 2020-08-28 RX ORDER — HEPARIN SODIUM 200 [USP'U]/100ML
INJECTION, SOLUTION INTRAVENOUS
Status: COMPLETED | OUTPATIENT
Start: 2020-08-28 | End: 2020-08-28

## 2020-08-28 RX ORDER — SODIUM CHLORIDE 0.9 % (FLUSH) 0.9 %
5-40 SYRINGE (ML) INJECTION AS NEEDED
Status: DISCONTINUED | OUTPATIENT
Start: 2020-08-28 | End: 2020-09-01 | Stop reason: HOSPADM

## 2020-08-28 RX ORDER — SODIUM CHLORIDE 9 MG/ML
100 INJECTION, SOLUTION INTRAVENOUS CONTINUOUS
Status: DISPENSED | OUTPATIENT
Start: 2020-08-28 | End: 2020-08-29

## 2020-08-28 RX ORDER — AMLODIPINE BESYLATE 5 MG/1
5 TABLET ORAL DAILY
Status: DISCONTINUED | OUTPATIENT
Start: 2020-08-29 | End: 2020-09-01

## 2020-08-28 RX ORDER — PANTOPRAZOLE SODIUM 40 MG/1
40 TABLET, DELAYED RELEASE ORAL
Status: DISCONTINUED | OUTPATIENT
Start: 2020-08-29 | End: 2020-09-01 | Stop reason: HOSPADM

## 2020-08-28 RX ORDER — ACETAMINOPHEN 325 MG/1
650 TABLET ORAL
Status: DISCONTINUED | OUTPATIENT
Start: 2020-08-28 | End: 2020-08-28

## 2020-08-28 RX ORDER — PROMETHAZINE HYDROCHLORIDE 25 MG/1
12.5 TABLET ORAL
Status: DISCONTINUED | OUTPATIENT
Start: 2020-08-28 | End: 2020-09-01 | Stop reason: HOSPADM

## 2020-08-28 RX ORDER — NALOXONE HYDROCHLORIDE 0.4 MG/ML
0.4 INJECTION, SOLUTION INTRAMUSCULAR; INTRAVENOUS; SUBCUTANEOUS AS NEEDED
Status: DISCONTINUED | OUTPATIENT
Start: 2020-08-28 | End: 2020-09-01 | Stop reason: HOSPADM

## 2020-08-28 RX ORDER — GUAIFENESIN 100 MG/5ML
81 LIQUID (ML) ORAL DAILY
Status: DISCONTINUED | OUTPATIENT
Start: 2020-08-28 | End: 2020-09-01 | Stop reason: HOSPADM

## 2020-08-28 RX ORDER — ACETAMINOPHEN 650 MG/1
650 SUPPOSITORY RECTAL
Status: DISCONTINUED | OUTPATIENT
Start: 2020-08-28 | End: 2020-08-28

## 2020-08-28 RX ORDER — ONDANSETRON 2 MG/ML
4 INJECTION INTRAMUSCULAR; INTRAVENOUS
Status: DISCONTINUED | OUTPATIENT
Start: 2020-08-28 | End: 2020-08-28 | Stop reason: SDUPTHER

## 2020-08-28 RX ORDER — EPTIFIBATIDE 0.75 MG/ML
1 INJECTION, SOLUTION INTRAVENOUS CONTINUOUS
Status: ACTIVE | OUTPATIENT
Start: 2020-08-28 | End: 2020-08-28

## 2020-08-28 RX ORDER — HEPARIN SODIUM 5000 [USP'U]/ML
4000 INJECTION, SOLUTION INTRAVENOUS; SUBCUTANEOUS AS NEEDED
Status: DISCONTINUED | OUTPATIENT
Start: 2020-08-28 | End: 2020-08-28

## 2020-08-28 RX ORDER — HEPARIN SODIUM 10000 [USP'U]/100ML
11.9-25 INJECTION, SOLUTION INTRAVENOUS
Status: DISCONTINUED | OUTPATIENT
Start: 2020-08-28 | End: 2020-08-28

## 2020-08-28 RX ORDER — HEPARIN SODIUM 5000 [USP'U]/ML
4000 INJECTION, SOLUTION INTRAVENOUS; SUBCUTANEOUS AS NEEDED
Status: DISCONTINUED | OUTPATIENT
Start: 2020-08-28 | End: 2020-09-01 | Stop reason: ALTCHOICE

## 2020-08-28 RX ORDER — MORPHINE SULFATE 4 MG/ML
4 INJECTION, SOLUTION INTRAMUSCULAR; INTRAVENOUS
Status: DISCONTINUED | OUTPATIENT
Start: 2020-08-28 | End: 2020-08-28

## 2020-08-28 RX ORDER — LORAZEPAM 0.5 MG/1
0.5 TABLET ORAL
Status: DISCONTINUED | OUTPATIENT
Start: 2020-08-28 | End: 2020-09-01 | Stop reason: HOSPADM

## 2020-08-28 RX ORDER — POLYETHYLENE GLYCOL 3350 17 G/17G
17 POWDER, FOR SOLUTION ORAL DAILY PRN
Status: DISCONTINUED | OUTPATIENT
Start: 2020-08-28 | End: 2020-09-01 | Stop reason: HOSPADM

## 2020-08-28 RX ORDER — HEPARIN SODIUM 5000 [USP'U]/ML
4000 INJECTION, SOLUTION INTRAVENOUS; SUBCUTANEOUS ONCE
Status: COMPLETED | OUTPATIENT
Start: 2020-08-28 | End: 2020-08-28

## 2020-08-28 RX ADMIN — NITROGLYCERIN 20 MCG/MIN: 200 INJECTION, SOLUTION INTRAVENOUS at 14:00

## 2020-08-28 RX ADMIN — HEPARIN SODIUM 4000 UNITS: 5000 INJECTION, SOLUTION INTRAVENOUS; SUBCUTANEOUS at 07:15

## 2020-08-28 RX ADMIN — Medication 10 ML: at 15:07

## 2020-08-28 RX ADMIN — SODIUM CHLORIDE 100 ML/HR: 900 INJECTION, SOLUTION INTRAVENOUS at 13:44

## 2020-08-28 RX ADMIN — ATORVASTATIN CALCIUM 40 MG: 40 TABLET, FILM COATED ORAL at 05:05

## 2020-08-28 RX ADMIN — ASPIRIN 81 MG CHEWABLE TABLET 81 MG: 81 TABLET CHEWABLE at 05:06

## 2020-08-28 RX ADMIN — HEPARIN SODIUM 11.9 UNITS/KG/HR: 10000 INJECTION, SOLUTION INTRAVENOUS at 07:22

## 2020-08-28 RX ADMIN — HEPARIN SODIUM 12 UNITS/KG/HR: 10000 INJECTION, SOLUTION INTRAVENOUS at 18:20

## 2020-08-28 RX ADMIN — MORPHINE SULFATE 2 MG: 2 INJECTION, SOLUTION INTRAMUSCULAR; INTRAVENOUS at 13:44

## 2020-08-28 RX ADMIN — EPTIFIBATIDE 1 MCG/KG/MIN: 0.75 INJECTION INTRAVENOUS at 12:58

## 2020-08-28 RX ADMIN — SODIUM CHLORIDE 125 ML/HR: 900 INJECTION, SOLUTION INTRAVENOUS at 05:00

## 2020-08-28 NOTE — Clinical Note
TRANSFER - OUT REPORT:     Verbal report given to: Vineet Almaraz RN. Report consisted of patient's Situation, Background, Assessment and   Recommendations(SBAR). Opportunity for questions and clarification was provided. Patient transported to: [facility].

## 2020-08-28 NOTE — CONSULTS
Consult    NAME: Genny Contreras   :  1950   MRN:  [id number]     Date/Time:  2020 7:44 AM    Patient PCP: Robyn Raines MD    Consult requested by:  Dr Car Albany  Primary cardiologist: Dr Heather Markham   ________________________________________________________________________     Assessment:     1. Acute coronary syndrome, NSTEMI  2. CAD s/p CABG in    3. HTN, HLP  4. Renal insufficiency   5. pAfib   6. Tachybrady syndrome s/p PM   7. Mild to moderate AI   8. Chronic RBBB         Plan:     1. No current chest pain   2. Cont aspirin, started on hep gtt   3. Cont statin   4. Will plan for LHC this morning, keep NPO  5. Continue hydration for renal protection   9. If has recurrent chest pain then EKG and SL nitro, and call cardiology   6. 2d echocardiogram     Thank you for this consult and allowing me to take part in this patients care. Please call with questions. [x]        High complexity decision making was performed        Subjective:     CHIEF COMPLAINT: CP, ACS    HISTORY OF PRESENT ILLNESS:     Mohinder Stephenson is a 65 y.o. Male with h/o CAD S/P CABG in , pAfib, Bradycardia s/p PM in 2017, HTN, HLP presented with CP and ruled in for ACS. He reports started having CP at 7 pm at rest, pressure like sensation with radiation in both arm, associated with nausea, diaphoresis. He went to [facility] ER and given nitro then pain eased off but continue to have some discomfort for some time and then eventually resolved. Unclear but appears he got lovenox around 9 pm. He also received aspirin. He denied any previous episode of chest pain in recent past at rest or with exertion. He has LARSON in past but improved after amio was discontinued. He also had abn thyroid and trnasaminases with that. His EKG showed STT changes in V1-3, trop increased to 13, no current chest pain. We were asked to consult for work up and evaluation of the above problems.      Past Medical History:   Diagnosis Date    CAD (coronary artery disease)     Hypertension       Past Surgical History:   Procedure Laterality Date    CARDIAC SURG PROCEDURE UNLIST  2004    CABG     No Known Allergies   Meds:  See below  Social History     Tobacco Use    Smoking status: Never Smoker    Smokeless tobacco: Never Used   Substance Use Topics    Alcohol use: Yes     Alcohol/week: 5.0 standard drinks     Types: 5 Glasses of wine per week      Family History   Problem Relation Age of Onset    Heart Disease Mother     Heart Disease Father     Hypertension Father        REVIEW OF SYSTEMS:         Total of 12 systems reviewed, all systems review was negative except Pertinent Positives included in HPI       Objective:      Physical Exam:    Last 24hrs VS reviewed since prior progress note. Most recent are:    Visit Vitals  /62 (BP 1 Location: Left arm, BP Patient Position: At rest)   Pulse 63   Temp 97.5 °F (36.4 °C)   Resp 18   Ht 5' 11\" (1.803 m)   Wt 83.9 kg (185 lb)   SpO2 99%   BMI 25.80 kg/m²       Intake/Output Summary (Last 24 hours) at 8/28/2020 0744  Last data filed at 8/28/2020 0440  Gross per 24 hour   Intake    Output 350 ml   Net -350 ml        Examination:     General: Alert + Oriented x3, no acute distress   HEENT: Normocephalic aromatic, MMM   Neck: Supple, JVP- not well appreciated   RS: Non labored, clear   CVS: Regular rate and rhythm, S1S2, no murmur   Abd: Soft, non tender, non distended   Lower extremity: Warm to touch, Edema- None   Skin: Warm and dry, No significant bruises or rash   CNS: Oriented x3, no focal neuro deficit           Data:      Telemetry: NSR     EKG:  NSR, RBBB, ST depression TWI in V1-3        Prior to Admission medications    Medication Sig Start Date End Date Taking? Authorizing Provider   amLODIPine (NORVASC) 10 mg tablet Take  by mouth daily. Yes Provider, Historical   omega 3-dha-epa-fish oil (FISH OIL) 100-160-1,000 mg cap Take  by mouth.    Yes Provider, Historical   diclofenac (VOLTAREN) 1 % gel Apply  to affected area four (4) times daily. Yes Provider, Historical   losartan (COZAAR) 100 mg tablet Take 50 mg by mouth two (2) times a day. Yes Provider, Historical   Hydrochlorothiazide (HYDRODIURIL) 12.5 mg tablet Take 12.5 mg by mouth daily. Yes Provider, Historical   atorvastatin (LIPITOR) 20 mg tablet Take  by mouth daily. Yes Provider, Historical   Aspirin, Buffered 81 mg tab Take  by mouth daily.    Yes Provider, Historical     TROPONIN I    Collection Time: 08/28/20  4:20 AM   Result Value Ref Range    Troponin-I, Qt. 13.30 (H) <0.05 ng/mL   CBC W/O DIFF    Collection Time: 08/28/20  4:20 AM   Result Value Ref Range    WBC 6.3 4.1 - 11.1 K/uL    RBC 3.92 (L) 4.10 - 5.70 M/uL    HGB 12.6 12.1 - 17.0 g/dL    HCT 37.9 36.6 - 50.3 %    MCV 96.7 80.0 - 99.0 FL    MCH 32.1 26.0 - 34.0 PG    MCHC 33.2 30.0 - 36.5 g/dL    RDW 12.6 11.5 - 14.5 %    PLATELET 284 578 - 920 K/uL    MPV 10.7 8.9 - 12.9 FL    NRBC 0.0 0  WBC    ABSOLUTE NRBC 0.00 0.00 - 5.78 K/uL   METABOLIC PANEL, BASIC    Collection Time: 08/28/20  4:20 AM   Result Value Ref Range    Sodium 138 136 - 145 mmol/L    Potassium 3.9 3.5 - 5.1 mmol/L    Chloride 110 (H) 97 - 108 mmol/L    CO2 25 21 - 32 mmol/L    Anion gap 3 (L) 5 - 15 mmol/L    Glucose 147 (H) 65 - 100 mg/dL    BUN 25 (H) 6 - 20 MG/DL    Creatinine 1.59 (H) 0.70 - 1.30 MG/DL    BUN/Creatinine ratio 16 12 - 20      GFR est AA 52 (L) >60 ml/min/1.73m2    GFR est non-AA 43 (L) >60 ml/min/1.73m2    Calcium 8.0 (L) 8.5 - 10.1 MG/DL   PROTHROMBIN TIME + INR    Collection Time: 08/28/20  4:20 AM   Result Value Ref Range    INR 1.1 0.9 - 1.1      Prothrombin time 11.7 (H) 9.0 - 11.1 sec   HEMOGLOBIN A1C WITH EAG    Collection Time: 08/28/20  4:20 AM   Result Value Ref Range    Hemoglobin A1c 5.5 4.0 - 5.6 %    Est. average glucose 111 mg/dL   LIPID PANEL    Collection Time: 08/28/20  4:20 AM   Result Value Ref Range    LIPID PROFILE          Cholesterol, total 132 <200 MG/DL    Triglyceride 48 <150 MG/DL    HDL Cholesterol 51 MG/DL    LDL, calculated 71.4 0 - 100 MG/DL    VLDL, calculated 9.6 MG/DL    CHOL/HDL Ratio 2.6 0.0 - 5.0     PTT    Collection Time: 08/28/20  4:20 AM   Result Value Ref Range    aPTT 31.1 22.1 - 32.0 sec    aPTT, therapeutic range     58.0 - 77.0 Media Claude, MD

## 2020-08-28 NOTE — Clinical Note
Multiple views of the saphenous vein graft to the diagonal obtained using hand injection.  And Ramus

## 2020-08-28 NOTE — ED PROVIDER NOTES
EMERGENCY DEPARTMENT HISTORY AND PHYSICAL EXAM     ------------------------------------------------------------------------------------------------------  Please note that this dictation was completed with Brainly, the [employer] voice recognition software. Quite often unanticipated grammatical, syntax, homophones, and other interpretive errors are inadvertently transcribed by the computer software. Please disregard these errors. Please excuse any errors that have escaped final proofreading.  -----------------------------------------------------------------------------------------------------------------    Date: 8/28/2020  Patient Name: Jay Molina    History of Presenting Illness     Chief Complaint   Patient presents with    Chest Pain       History Provided By: Patient, OSH    HPI: Jay Molina is a 65 y.o. male, with significant pmhx of hypertension, CAD, with a [employer] pacemaker who presents via EMS as a transfer from [facility] free standing ED to the ED with report of unstable angina. Patient reports having sudden onset of chest pain/presure that was midsternal nonradiating at 7 PM yesterday evening. Patient presented to outside hospital with negative troponin and negative chest x-ray. Patient was given 3 sublingual nitro and morphine with noted resolution of his chest pain. Patient without evidence of STEMI on EKG or posterior EKG. Patient was initially directly admitted to the hospitalist service but due to lack of inpatient beds available patient was transferred to our ER for admission. Pt also specifically denies any recent fevers, chills, SOB, nausea, vomiting, diarrhea, abd pain, changes in BM, urinary sxs, or headache. Time of his arrival to our emergency department patient reports having much improvement of his previously noted pain although still somewhat present with a pressure type sensation. Patient declines analgesic offered to him at this time.       Notes that he is followed by Dr. Diane Brasher of [facility]. PCP: Mimi Ely MD    Social Hx: deniestobacco, denies EtOH, denies Illicit Drugs     There are no other complaints, changes, or physical findings at this time. No Known Allergies      Current Facility-Administered Medications   Medication Dose Route Frequency Provider Last Rate Last Dose    acetaminophen (TYLENOL) tablet 650 mg  650 mg Oral Q6H PRN Barbra Nunez MD        ondansetron [facility]) injection 4 mg  4 mg IntraVENous Q4H PRN Barbra Nunez MD        morphine injection 4 mg  4 mg IntraVENous Q4H PRN Barbra Nunez MD        0.9% sodium chloride infusion  125 mL/hr IntraVENous CONTINUOUS Barbra Nunez MD         Current Outpatient Medications   Medication Sig Dispense Refill    omega 3-dha-epa-fish oil (FISH OIL) 100-160-1,000 mg cap Take  by mouth.  diclofenac (VOLTAREN) 1 % gel Apply  to affected area four (4) times daily.  losartan (COZAAR) 100 mg tablet Take 50 mg by mouth two (2) times a day.  Hydrochlorothiazide (HYDRODIURIL) 12.5 mg tablet Take 12.5 mg by mouth daily.  amiodarone (CORDARONE) 200 mg tablet Take  by mouth daily.  atorvastatin (LIPITOR) 20 mg tablet Take  by mouth daily.  Aspirin, Buffered 81 mg tab Take  by mouth daily. Past History     Past Medical History:  Past Medical History:   Diagnosis Date    CAD (coronary artery disease)     Hypertension        Past Surgical History:  Past Surgical History:   Procedure Laterality Date    CARDIAC SURG PROCEDURE UNLIST  2004    CABG       Family History:  Family History   Problem Relation Age of Onset    Heart Disease Mother     Heart Disease Father     Hypertension Father        Social History:  Social History     Tobacco Use    Smoking status: Never Smoker    Smokeless tobacco: Never Used   Substance Use Topics    Alcohol use:  Yes     Alcohol/week: 5.0 standard drinks     Types: 5 Glasses of wine per week    Drug use: No       Allergies:  No Known Allergies      Review of Systems   Review of Systems   Constitutional: Negative for chills and fever. HENT: Negative. Eyes: Negative. Respiratory: Negative for cough, chest tightness and shortness of breath. Cardiovascular: Positive for chest pain. Negative for leg swelling. Gastrointestinal: Negative for abdominal pain, diarrhea, nausea and vomiting. Endocrine: Negative. Genitourinary: Negative for difficulty urinating and dysuria. Musculoskeletal: Negative for myalgias. Skin: Negative. Neurological: Negative. Psychiatric/Behavioral: Negative. All other systems reviewed and are negative. Physical Exam   Physical Exam  Vitals signs and nursing note reviewed. Constitutional:       General: He is not in acute distress. Appearance: He is well-developed. He is not diaphoretic. HENT:      Head: Normocephalic and atraumatic. Nose: Nose normal.      Mouth/Throat:      Pharynx: No oropharyngeal exudate. Eyes:      Conjunctiva/sclera: Conjunctivae normal.      Pupils: Pupils are equal, round, and reactive to light. Neck:      Musculoskeletal: Normal range of motion and neck supple. Vascular: No JVD. Cardiovascular:      Rate and Rhythm: Normal rate and regular rhythm. Heart sounds: Normal heart sounds. No murmur. No friction rub. Pulmonary:      Effort: Pulmonary effort is normal. No respiratory distress. Breath sounds: Normal breath sounds. No stridor. No wheezing or rales. Abdominal:      General: Bowel sounds are normal. There is no distension. Palpations: Abdomen is soft. Tenderness: There is no abdominal tenderness. There is no rebound. Musculoskeletal: Normal range of motion. General: No tenderness. Skin:     General: Skin is warm and dry. Findings: No rash. Neurological:      Mental Status: He is alert and oriented to person, place, and time. Cranial Nerves: No cranial nerve deficit.    Psychiatric: Speech: Speech normal.         Behavior: Behavior normal.         Thought Content: Thought content normal.         Judgment: Judgment normal.           Diagnostic Study Results     Labs -     Recent Results (from the past 12 hour(s))   EKG, 12 LEAD, INITIAL    Collection Time: 08/28/20 12:52 AM   Result Value Ref Range    Ventricular Rate 60 BPM    Atrial Rate 60 BPM    P-R Interval 230 ms    QRS Duration 152 ms    Q-T Interval 506 ms    QTC Calculation (Bezet) 506 ms    Calculated P Axis 57 degrees    Calculated R Axis -13 degrees    Calculated T Axis 32 degrees    Diagnosis        Suspect unspecified pacemaker failure  Sinus rhythm with 1st degree AV block  Right bundle branch block  Possible Lateral infarct , age undetermined  When compared with ECG of 04-MAR-2017 05:07,  AR interval has increased  Nonspecific T wave abnormality now evident in Inferior leads  T wave inversion more evident in Anterior leads         Radiologic Studies -   No orders to display     CT Results  (Last 48 hours)    None        CXR Results  (Last 48 hours)    None            Medical Decision Making   I am the first provider for this patient. I reviewed the vital signs, available nursing notes, past medical history, past surgical history, family history and social history. Vital Signs-Reviewed the patient's vital signs. Patient Vitals for the past 12 hrs:   Temp Pulse Resp BP SpO2   08/28/20 0105 97.7 °F (36.5 °C) 61 20 111/54 99 %       Pulse Oximetry Analysis - 99% on RA    Records Reviewed/Interpretted: Nursing Notes from triage and Old Medical Records, cleaning previous admission in March 2017    Provider Notes (Medical Decision Making):     DDX:  Unstable angina    Plan:  Admission to hospitalist, repeat EKG    Impression:  Unstable angina    ED Course:   Initial assessment performed. The patients presenting problems have been discussed, and they are in agreement with the care plan formulated and outlined with them.   I have encouraged them to ask questions as they arise throughout their visit. I reviewed our electronic medical record system for any past medical records that were available that may contribute to the patients current condition, the nursing notes and and vital signs from today's visit  Nursing notes will be reviewed as they become available in realtime while the pt has been in the ED. Marilee Villasenor MD    CONSULT NOTE:   1:25 AM  Marilee Villasenor MD spoke with Dr. Viktoria Boeck,   Specialty: Raynard Pitman Dr. Viktoria Boeck due to unstable angina. Discussed pt's HPI and available diagnostic results thus far. Expressed concerns for needed admission. Consultant will evaluate for admission. Marilee Villasenor MD    ADMISSION NOTE:  1:25 AM  Patient is being admitted to the hospital by Dr. Viktoria Boeck. The results of their tests and reasons for their admission have been discussed with them and/or available family. They convey agreement and understanding for the need to be admitted and for their admission diagnosis. Marilee Villasenor MD           Critical Care Time:     none      Diagnosis     Clinical Impression:   1. Unstable angina (HCC)        PLAN:  1. Admit to hospitalist        This note will not be viewable in [address]

## 2020-08-28 NOTE — Clinical Note
Shortly after manual pressure on right groin was discontinued, pts hematoma increased in size, pts site oozing/bloody. Manual pressure reapplied by Shelly Smith RN. Bennie Alfaro at bedside. Will continue to monitor.

## 2020-08-28 NOTE — PROCEDURES
BRIEF OPERATIVE NOTE    Date of Procedure: 8/28/2020   Procedure: Cardiac catheterization    Preoperative Diagnosis: Coronary artery disease  Postoperative Diagnosis: Coronary artery disease     Surgeon/assistant: Sidra Schuler MD    Anesthesia: Conscious sedation  Estimated Blood Loss: <50cc  Specimens: None    Findings:      Intervention: Y SVG to diagonal and ramus. Diagonal fills the LAD. Limb to ramus had extensive thrombus. Patient anticoagulated with bivalrudin and integrelin. Penumbra x 2 improved flow down the limb to ramus, however there now appeared to be some thrombus going down the limb to LAD. The LAD was also wired and Penumbra performed. The penumbra did not consistently improve flow, with very dynamic formation and dissolution of thrombus. A 4.0 mm spider filter was placed in the LAD. The main intervention was time and anticoagulation. There was no discrete area to stent. Flow improved to ramus still with some thrombus in graft, good YAIR flow down diagonal and LAD with small thrombus. The risk of ballooning and stenting and risk of no reflow was felt to be substantial.   The filter was retrieved without issue. At this point it was felt to give time with anticoagulation with plan for repeat diagnostic cath in a few days to see if flow improved.        Complications: None  Non-coronary Implants: None

## 2020-08-28 NOTE — PROGRESS NOTES
[facility]  Home  Follow up appointments    Reason for Admission:   Chest pain, unstable angina                   RUR Score:     11                Plan for utilizing home health:      n/a    PCP: First and Last name:  Vipul Bass MD    Name of Practice:    Are you a current patient: Yes/No:    Approximate date of last visit:    Can you participate in a virtual visit with your PCP:                     Current Advanced Directive/Advance Care Plan:   Not discussed. Patient off unit. Wife is [place]    CM met with patient's wife. Patient is off unit. Pt name and  confirmed as pt identifiers. Demographics confirmed. Lives in a 2 story home. Living quarters on first floor. ADL's/IADL's - independent pta to include steps and driving. Preferred Rx - [address] Wife will provide transportation at time of discharge. Care Management Interventions  PCP Verified by CM: Yes  Mode of Transport at Discharge:  Other (see comment)(wife)    Delmis Rivero RN CM  Ext [phone]

## 2020-08-28 NOTE — Clinical Note
Sheath #1: Closed using Angio-Seal and Hemostasis Pad. Site secured by Tegaderm. Pressure held for: 10 minutes.

## 2020-08-28 NOTE — Clinical Note
TRANSFER - OUT REPORT:     Verbal report given to: Jennifer Chinchilla RN. Report consisted of patient's Situation, Background, Assessment and   Recommendations(SBAR). Opportunity for questions and clarification was provided. Patient transported with a Registered Nurse. Patient transported to: [facility].

## 2020-08-28 NOTE — Clinical Note
Multiple views of the saphenous vein graft to the diagonal obtained using hand injection.  Y-graft to OM/Diag

## 2020-08-28 NOTE — CARDIO/PULMONARY
Cardiac Rehab Note: chart review Consult has been acknowledged NSTEMI, cath with stent 8/28/20 Smoking history assessed. Patient is a non smoker. Smoking Cessation Program link has not been added to the AVS. Echo ordered. MI education folder, with heart heathy diet, warning signs, heart facts, catheterization brochure, and out patient cardiac rehab program to bedside of Jay Molina. Patient was not available/able to meet at this time with no family present at bedside. Nursing with patient for EKG. CP Rehab will attempt to follow up.

## 2020-08-28 NOTE — ED NOTES
Pt arrived via [facility] ER transfer with CP starting at 7pm.  Pt reports it radiates down both his arms. Pt received 3 Nitro tabs from [facility] ER and morphine. Pt states his pain is a 3/10 currently. Pt denies SOB, fever, cough, dizziness, or sick contacts.

## 2020-08-28 NOTE — PROGRESS NOTES
3:00 PM Bedside report received from [facility].    3:45 PM Notified Dr. Lexie Arias of patient's c/o pain L chest and shoulder which patient states is \"increasing\" and patient rates 3-4/10. MD aware of last morphine dose and current rate of nitro gtt. MD aware of VS trend and current assessment findings. No new orders received. MD aware R groin site with femostop which will be taken down now, as ordered. MD aware site is without bleeding and/or hematoma and site remains slightly ecchymotic.    3:50 PM Femostop taken down without complication. Dressing is c/d/i no bleeding and no hematoma. Slight ecchymosis observed. VSS. Pulses palpable all extremities. Will continue to monitor. 5:00 PM Received call from Dr. Lexie Arias. MD aware of current VS, assessment findings and site assessment. MD states to transition integrillin gtt to heparin gtt. MD states to change gtt to heparin and immediately turn off integrillin at that time. Spoke with PharmD, Tito Booth. Per Tito Booth, aPTT was drawn this AM. Patient OK to start heparin gtt and re-check aPTT 6 hours after gtt started. Awaiting heparin from pharmacy. 6:20 PM Heparin gtt received from pharmacy. Integrillin gtt stopped, as ordered, and heparin gtt started. See MAR. Dr. Lexie Arias at bedside. MD states patient OK to sit at 15 degrees in 30 minutes and 30 degrees in 1 hour. Site remains c/d/i no bleeding and no hematoma. Ecchymosis remains. Will continue to monitor. Bedside shift change report given to Jeferson Parker RN (oncoming nurse) by Neil Magaña RN (offgoing nurse). Report included the following information SBAR, Kardex, Intake/Output, MAR, Recent Results, Med Rec Status and Cardiac Rhythm NSR/PACED.

## 2020-08-28 NOTE — H&P
Hospitalist Admission Note    NAME: Jay Molina   :  1950   MRN:  [id number]     Date/Time:  2020 2:24 AM    Patient PCP: Neha Bates MD  _____________________________________________________________________  Given the patient's current clinical presentation, I have a high level of concern for decompensation if discharged from the emergency department. Complex decision making was performed, which includes reviewing the patient's available past medical records, laboratory results, and x-ray films. My assessment of this patient's clinical condition and my plan of care is as follows. Assessment / Plan:  Chest pain/abnormal EKG/unstable angina-POA-currently patient denies chest pain or shortness of breath to me and resting comfortably in the bed   Negative troponin and negative chest x-ray     EKG at OSH -sinus with first degree AV block, no STEMI but v2-v4 ST Depression RBBB   S/P ASA/Nitro/moprhine at osh ED  -Patient got therapeutic Lovenox yesterday and around 9 PM on  at outside hospital  Will need next Lovenox dose 9 AM today but will not order now for possible cardiac catheterization procedure in the morning.      -PatientConsulted Dr. Delilah Leone of [facility]. -Admit to Tele  -NPO for possible procedure in am  -AC  -Asa bb nitrate  Statin  -check utox  -Check a1c FLP  Trend. Trop. CAD/HTN/ Status post pacemaker- Cont. Home meds  CABG 16th ago and pacemaker more than 2 years ago by Dr. Delilah Leone. Patient is not on beta-blocker at home    Code Status: full  Surrogate Decision Maker:  Alisia Last  Spouse  [phone]          DVT Prophylaxis: AC  GI Prophylaxis: not indicated    Baseline:  Independent        Subjective:   CHIEF COMPLAINT:  CP    HISTORY OF PRESENT ILLNESS:    Jay Molina is a 65 y.o. male, with significant pmhx of hypertension, CAD, with a [employer] pacemaker who presents via EMS as a transfer from [facility] ED to [address] [place] [address] ED with report of unstable angina. Patient reports having sudden onset of chest pain/presure that was midsternal nonradiating at 7 PM yesterday evening. Patient presented to outside hospital with negative troponin and negative chest x-ray. Patient was given 3 sublingual nitro and morphine with noted resolution of his chest pain. Patient without evidence of STEMI on EKG or posterior EKG. Patient was initially directly admitted to the hospitalist service but due to lack of inpatient beds available patient was transferred to our ER for admission. Pt also specifically denies any recent fevers, chills, SOB, nausea, vomiting, diarrhea, abd pain, changes in BM, urinary sxs, or headache. Time of his arrival to our emergency department patient reports having much improvement of his previously noted pain although still somewhat present with a pressure type sensation. Patient declines analgesic offered to him at this time. Notes that he is followed by Dr. Sherri Voss of [facility]. PCP: Nella Suazo MD     Social Hx: deniestobacco, denies EtOH, denies Illicit Drugs      There are no other complaints, changes, or physical findings at this time. We were asked to admit for work up and evaluation of the above problems.      Pulse Oximetry Analysis - 99% on RA     EKG ED in [facility] today  Sinus rhythm with 1st degree AV block  Right bundle branch block  Possible Lateral infarct , age undetermined  When compared with ECG of 04-MAR-2017 05:07,  KS interval has increased  Nonspecific T wave abnormality now evident in Inferior leads  T wave inversion more evident in Anterior leads         Past Medical History:   Diagnosis Date    CAD (coronary artery disease)     Hypertension         Past Surgical History:   Procedure Laterality Date    CARDIAC SURG PROCEDURE UNLIST  2004    CABG       Social History     Tobacco Use    Smoking status: Never Smoker    Smokeless tobacco: Never Used   Substance Use Topics    Alcohol use: Yes     Alcohol/week: 5.0 standard drinks     Types: 5 Glasses of wine per week        Family History   Problem Relation Age of Onset    Heart Disease Mother     Heart Disease Father     Hypertension Father      No Known Allergies     Prior to Admission medications    Medication Sig Start Date End Date Taking? Authorizing Provider   omega 3-dha-epa-fish oil (FISH OIL) 100-160-1,000 mg cap Take  by mouth. Provider, Historical   diclofenac (VOLTAREN) 1 % gel Apply  to affected area four (4) times daily. Provider, Historical   losartan (COZAAR) 100 mg tablet Take 50 mg by mouth two (2) times a day. Provider, Historical   Hydrochlorothiazide (HYDRODIURIL) 12.5 mg tablet Take 12.5 mg by mouth daily. Provider, Historical   amiodarone (CORDARONE) 200 mg tablet Take  by mouth daily. Provider, Historical   atorvastatin (LIPITOR) 20 mg tablet Take  by mouth daily. Provider, Historical   Aspirin, Buffered 81 mg tab Take  by mouth daily. Provider, Historical       REVIEW OF SYSTEMS:     I am not able to complete the review of systems because: The patient is intubated and sedated    The patient has altered mental status due to his acute medical problems    The patient has baseline aphasia from prior stroke(s)    The patient has baseline dementia and is not reliable historian    The patient is in acute medical distress and unable to provide information           Constitutional: Negative for chills and fever. HENT: Negative. Eyes: Negative. Respiratory: Negative for cough, chest tightness and shortness of breath. Cardiovascular: Positive for chest pain. Negative for leg swelling. Gastrointestinal: Negative for abdominal pain, diarrhea, nausea and vomiting. Endocrine: Negative. Genitourinary: Negative for difficulty urinating and dysuria. Musculoskeletal: Negative for myalgias. Skin: Negative. Neurological: Negative. Psychiatric/Behavioral: Negative. All other systems reviewed and are negative. Objective:   VITALS:    Visit Vitals  /56   Pulse 60   Temp 97.7 °F (36.5 °C)   Resp 14   Ht 5' 11\" (1.803 m)   Wt 83.9 kg (185 lb)   SpO2 92%   BMI 25.80 kg/m²       PHYSICAL EXAM:    __Constitutional:       General: He is not in acute distress. Appearance: He is well-developed. He is not diaphoretic. HENT:      Head: Normocephalic and atraumatic. Nose: Nose normal.      Mouth/Throat:      Pharynx: No oropharyngeal exudate. Eyes:      Conjunctiva/sclera: Conjunctivae normal.      Pupils: Pupils are equal, round, and reactive to light. Neck:      Musculoskeletal: Normal range of motion and neck supple. Vascular: No JVD. Cardiovascular:      Rate and Rhythm: Normal rate and regular rhythm. Heart sounds: Normal heart sounds. No murmur. No friction rub. Pulmonary:      Effort: Pulmonary effort is normal. No respiratory distress. Breath sounds: Normal breath sounds. No stridor. No wheezing or rales. Abdominal:      General: Bowel sounds are normal. There is no distension. Palpations: Abdomen is soft. Tenderness: There is no abdominal tenderness. There is no rebound. Musculoskeletal: Normal range of motion. General: No tenderness. Skin:     General: Skin is warm and dry. Findings: No rash. Neurological:      Mental Status: He is alert and oriented to person, place, and time. Cranial Nerves: No cranial nerve deficit. Psychiatric:         Speech: Speech normal.         Behavior: Behavior normal.         Thought Content:  Thought content normal.         Judgment: Judgment normal.   _____________________________________________________________________  Care Plan discussed with:    Comments   Patient y    Family      RN y    Care Manager                    Consultant:  lulu Lopez md   _______________________________________________________________________  Expected Disposition:   Home with Family y   HH/PT/OT/RN    SNF/LTC    ALTA    ________________________________________________________________________  TOTAL TIME:    Minutes    Critical Care Provided     Minutes non procedure based      Comments     Reviewed previous records   >50% of visit spent in counseling and coordination of care  Discussion with patient and/or family and questions answered       Given the patient's current clinical presentation, I have a high level of concern for decompensation if discharged from the ED. Complex decision making was performed which includes reviewing the patient's available past medical records, laboratory results, and Xray films. I have also directly communicated my plan and discussed this case with the involved ED physician.     ____________________________________________________________________  Carl Morirs MD    Procedures: see electronic medical records for all procedures/Xrays and details which were not copied into this note but were reviewed prior to creation of Plan.     LAB DATA REVIEWED:

## 2020-08-28 NOTE — PROGRESS NOTES
0184  TRANSFER - IN REPORT:    Verbal report received from Leonel Wright (name) on [employer]  being received from ED (unit) for routine progression of care      Report consisted of patients Situation, Background, Assessment and   Recommendations(SBAR). Information from the following report(s) SBAR was reviewed with the receiving nurse. Opportunity for questions and clarification was provided. Assessment completed upon patients arrival to unit and care assumed. 8850  Patient arrived on the floor    CHG bath done    0607  Dr. Grayson Roland informed of critical lab Troponin of 13. 3. patient denies of any chest pain.  Order received for heparin drip protocol    0644  Heparin order confirmed by pharmacist, heparin drip to start at 11.9u/kg/hr and bolus of 4000 units; orders still needs to be verified    0705  Called lab to run add-on PTT

## 2020-08-28 NOTE — ED NOTES
TRANSFER - OUT REPORT:    Verbal report given to KELLY Mina(name) on Jay Molina  being transferred to IVCU(unit) for routine progression of care       Report consisted of patients Situation, Background, Assessment and   Recommendations(SBAR). Information from the following report(s) SBAR, Kardex, ED Summary, Procedure Summary, MAR and Recent Results was reviewed with the receiving nurse. Lines:       Opportunity for questions and clarification was provided.       Patient transported with:   Monitor  Registered Nurse

## 2020-08-29 ENCOUNTER — APPOINTMENT (OUTPATIENT)
Dept: NON INVASIVE DIAGNOSTICS | Age: 70
DRG: 250 | End: 2020-08-29
Attending: HOSPITALIST
Payer: MEDICARE

## 2020-08-29 LAB
ANION GAP SERPL CALC-SCNC: 5 MMOL/L (ref 5–15)
APTT PPP: 64.7 SEC (ref 22.1–32)
APTT PPP: 77.4 SEC (ref 22.1–32)
BASOPHILS # BLD: 0 K/UL (ref 0–0.1)
BASOPHILS NFR BLD: 0 % (ref 0–1)
BUN SERPL-MCNC: 20 MG/DL (ref 6–20)
BUN/CREAT SERPL: 17 (ref 12–20)
CALCIUM SERPL-MCNC: 8.1 MG/DL (ref 8.5–10.1)
CHLORIDE SERPL-SCNC: 111 MMOL/L (ref 97–108)
CO2 SERPL-SCNC: 25 MMOL/L (ref 21–32)
CREAT SERPL-MCNC: 1.18 MG/DL (ref 0.7–1.3)
DIFFERENTIAL METHOD BLD: ABNORMAL
EOSINOPHIL # BLD: 0.1 K/UL (ref 0–0.4)
EOSINOPHIL NFR BLD: 1 % (ref 0–7)
ERYTHROCYTE [DISTWIDTH] IN BLOOD BY AUTOMATED COUNT: 13 % (ref 11.5–14.5)
GLUCOSE SERPL-MCNC: 109 MG/DL (ref 65–100)
HCT VFR BLD AUTO: 34.4 % (ref 36.6–50.3)
HGB BLD-MCNC: 11.4 G/DL (ref 12.1–17)
IMM GRANULOCYTES # BLD AUTO: 0 K/UL (ref 0–0.04)
IMM GRANULOCYTES NFR BLD AUTO: 0 % (ref 0–0.5)
LYMPHOCYTES # BLD: 0.7 K/UL (ref 0.8–3.5)
LYMPHOCYTES NFR BLD: 9 % (ref 12–49)
MCH RBC QN AUTO: 32.2 PG (ref 26–34)
MCHC RBC AUTO-ENTMCNC: 33.1 G/DL (ref 30–36.5)
MCV RBC AUTO: 97.2 FL (ref 80–99)
MONOCYTES # BLD: 0.8 K/UL (ref 0–1)
MONOCYTES NFR BLD: 10 % (ref 5–13)
NEUTS SEG # BLD: 6.5 K/UL (ref 1.8–8)
NEUTS SEG NFR BLD: 80 % (ref 32–75)
NRBC # BLD: 0 K/UL (ref 0–0.01)
NRBC BLD-RTO: 0 PER 100 WBC
PLATELET # BLD AUTO: 169 K/UL (ref 150–400)
PMV BLD AUTO: 10.7 FL (ref 8.9–12.9)
POTASSIUM SERPL-SCNC: 3.8 MMOL/L (ref 3.5–5.1)
RBC # BLD AUTO: 3.54 M/UL (ref 4.1–5.7)
RBC MORPH BLD: ABNORMAL
SODIUM SERPL-SCNC: 141 MMOL/L (ref 136–145)
THERAPEUTIC RANGE,PTTT: ABNORMAL SECS (ref 58–77)
THERAPEUTIC RANGE,PTTT: ABNORMAL SECS (ref 58–77)
WBC # BLD AUTO: 8.1 K/UL (ref 4.1–11.1)

## 2020-08-29 PROCEDURE — 85730 THROMBOPLASTIN TIME PARTIAL: CPT

## 2020-08-29 PROCEDURE — 93306 TTE W/DOPPLER COMPLETE: CPT

## 2020-08-29 PROCEDURE — 77010033678 HC OXYGEN DAILY

## 2020-08-29 PROCEDURE — 74011250637 HC RX REV CODE- 250/637: Performed by: INTERNAL MEDICINE

## 2020-08-29 PROCEDURE — 36415 COLL VENOUS BLD VENIPUNCTURE: CPT

## 2020-08-29 PROCEDURE — 94760 N-INVAS EAR/PLS OXIMETRY 1: CPT

## 2020-08-29 PROCEDURE — 74011250637 HC RX REV CODE- 250/637: Performed by: HOSPITALIST

## 2020-08-29 PROCEDURE — 85025 COMPLETE CBC W/AUTO DIFF WBC: CPT

## 2020-08-29 PROCEDURE — 74011000250 HC RX REV CODE- 250: Performed by: INTERNAL MEDICINE

## 2020-08-29 PROCEDURE — 74011250636 HC RX REV CODE- 250/636: Performed by: INTERNAL MEDICINE

## 2020-08-29 PROCEDURE — 65660000000 HC RM CCU STEPDOWN

## 2020-08-29 PROCEDURE — 80048 BASIC METABOLIC PNL TOTAL CA: CPT

## 2020-08-29 RX ADMIN — TICAGRELOR 90 MG: 90 TABLET ORAL at 08:51

## 2020-08-29 RX ADMIN — ATORVASTATIN CALCIUM 40 MG: 40 TABLET, FILM COATED ORAL at 21:25

## 2020-08-29 RX ADMIN — PANTOPRAZOLE SODIUM 40 MG: 40 TABLET, DELAYED RELEASE ORAL at 08:51

## 2020-08-29 RX ADMIN — TICAGRELOR 90 MG: 90 TABLET ORAL at 17:13

## 2020-08-29 RX ADMIN — ATORVASTATIN CALCIUM 40 MG: 40 TABLET, FILM COATED ORAL at 00:05

## 2020-08-29 RX ADMIN — Medication 10 ML: at 05:09

## 2020-08-29 RX ADMIN — SODIUM CHLORIDE 100 ML/HR: 900 INJECTION, SOLUTION INTRAVENOUS at 00:05

## 2020-08-29 RX ADMIN — Medication 10 ML: at 00:06

## 2020-08-29 RX ADMIN — ASPIRIN 81 MG CHEWABLE TABLET 81 MG: 81 TABLET CHEWABLE at 08:51

## 2020-08-29 RX ADMIN — TICAGRELOR 90 MG: 90 TABLET ORAL at 00:05

## 2020-08-29 RX ADMIN — AMLODIPINE BESYLATE 5 MG: 5 TABLET ORAL at 08:51

## 2020-08-29 RX ADMIN — NITROGLYCERIN 20 MCG/MIN: 20 INJECTION INTRAVENOUS at 12:59

## 2020-08-29 RX ADMIN — HEPARIN SODIUM 12 UNITS/KG/HR: 10000 INJECTION, SOLUTION INTRAVENOUS at 18:28

## 2020-08-29 RX ADMIN — Medication 10 ML: at 14:14

## 2020-08-29 RX ADMIN — Medication 10 ML: at 21:25

## 2020-08-29 NOTE — PROGRESS NOTES
Progress Note    NAME: Umer Armstrong   :  1950   MRN:  [id number]     Date/Time:  2020 7:44 AM    Patient PCP: Shelton Lentz MD  Primary cardiologist:  Dr. Gissel Alvarez   ________________________________________________________________________     Assessment:     1. Acute coronary syndrome, NSTEMI. Complicated PCI with heavy thrombus burden, see below. No chest pain today  2. Echo  with normal EF 55-60%, preliminarily  3. Mild to moderate AI   4. CAD s/p CABG in    5. HTN  6. HLP  7. Chronic renal insufficiency stage 3  8. pAfib   9. 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 Chronic RBBB   11. Full code        Plan:     1. Continue dual antiplatelet therapy. 2. Continue heparin infusion. 3. No change to cardiac medications otherwise. He's chest pain-free. Echo with good LV function, no evidence of actively compromised anterior wall circulation at rest.  No heart failure. No significant arrhythmias. Continue the current plan, possible re-cath on Monday by Dr. Navya Castro. Will defer to him to decide. [x]        High complexity decision making was performed    Subjective:     No chest pain, syncope, dizziness, palpitations. No nausea, vomiting, diaphoresis. No dyspnea at rest or on minimal exertion. He's on heparin infusion. REVIEW OF SYSTEMS:     3 systems reviewed, all systems review was negative except as noted above. Objective:      Physical Exam:    Last 24hrs VS reviewed since prior progress note.  Most recent are:    Visit Vitals  /56 (BP 1 Location: Left arm, BP Patient Position: At rest)   Pulse 67   Temp 97.9 °F (36.6 °C)   Resp 20   Ht 5' 11\" (1.803 m)   Wt 83.9 kg (185 lb)   SpO2 96%   BMI 25.80 kg/m²       Intake/Output Summary (Last 24 hours) at 2020 1341  Last data filed at 2020 0913  Gross per 24 hour   Intake 6275.75 ml   Output 1050 ml   Net 5225.75 ml        Examination:     General: Alert + Oriented x3, no acute distress   HEENT: Normocephalic aromatic, MMM   Neck: Supple, JVP- not well appreciated   RS: Non labored, clear, symmetric   CVS: Regular rate and rhythm, S1S2, no murmur   Abd: Soft, non tender, non distended   Lower extremity: Warm to touch, Edema- None   Skin: Warm and dry, No significant bruises or rash   CNS: Oriented x3, no focal neuro deficit         Data:      Telemetry:  No events    EKG in ER:  NSR, RBBB, ST depression TWI in V1-3      FINDINGS on cath 8/28:  1. Hemodynamics:  Aortic pressure was 114/60 with a mean of 81. Left ventricular pressure was 110/4 with an LVEDP of 15. There was no significant gradient on pullback across the aortic valve. 2.  Left ventriculography done from the SCHMITZ view revealed a normal-sized left ventricle with mild-to-moderate LV systolic dysfunction. There was anterior and anterolateral hypokinesis which was mild as well as mild mid inferior hypokinesis. Overall EF was 35%-40%. No significant mitral regurgitation was seen. 3.  Coronary graft angiography reveals severe left main and two-vessel coronary artery disease involving the entire left coronary artery. The left main had a 90% distal stenosis. The LAD was totally occluded proximally in the left circumflex and a subtotal ostial stenosis. The right coronary artery surprisingly had only mild disease and was a moderate-sized dominant vessel. It had diffuse mild irregularities and bifurcated into a fairly small PDA and large posterolateral branch without significant disease. There were three bypass grafts. The left internal mammary to the LAD was nonfunctional and was very small and appeared underdeveloped. There did appear to be floating on the body of this graft, but it did not feel the LAD. The saphenous vein graft to the distal marginal branch was patent and free of significant disease.   There was a 50% proximal area of narrowing in the body of this graft and the insertion site had no significant disease. The marginal branch was a distal marginal branch, which was moderate in size and had diffuse coarse irregularities. The Y saphenous vein graft to the ramus intermedius and diagonal vessels were patent. The limb of the saphenous vein graft to the diagonal was patent and free of significant disease. Noted to have large diagonal branch and there was backfilling of the LAD, which appeared to be a large vessel. The entire LAD filled via this diagonal branch and extended to the apex. There were no high-grade stenosis in the native LAD. This was beyond the occluded area in the proximal LAD. The branch of the Y graft to the ramus intermedius was filled with a large filling defect consistent with thrombus. The entire body of this graft appeared to be filled with thrombus and it attached to a moderate to large ramus intermedius vessel without apparent significant disease. There was flow down in this graft to the ramus intermedius vessel.     CONCLUSIONS:  1. Severe two-vessel native coronary artery disease involving the LAD, left circumflex and left main. Mild disease in the native right coronary artery. 2.  Nonfunctional left internal mammary graft to the LAD. 3.  Patent saphenous vein graft to the distal marginal vessel without significant disease. 4.  Patent saphenous vein Y graft to a large diagonal branch and the ramus intermedius vessel. The limb of this graft to the ramus intermedius had extensive filling defect present consistent with thrombus. 5.  Mild-to-moderate LV systolic dysfunction with wall motion abnormalities as described above. 6.  Normal resting LVEDP. PCI by Dr. Jose Maria Schwab 8/28:  Prox Graft lesion (Sequential Graft to 1st Diag, Lat Ramus)    Thrombectomy    The clot was removed mechanically. Passes taken: 1. Total fluid removal: 10 mL. Total duration: 60 sec. Pre YAIR flow was 2.     Supplies used: CATH THROMCTMY TBNG 5F 140CM -- PENUMBRA INDIGO CATRX; GDWIRE ANGIO CLEMENTE 180CM STR TIP -- ASAHI DANIELLE    Thrombectomy    The clot was removed mechanically. Passes taken: 1. Total fluid removal: 10 mL. Total duration: 30 sec. Supplies used: CATH THROMCTMY TBNG 5F 140CM -- PENUMBRA INDIGO CATRX; GDWIRE ANGIO CLEMENTE 180CM STR TIP -- ASAHI DANIELLE    Post-Intervention Lesion Assessment       Prox Graft to Dist Graft lesion (Sequential Graft to 1st Diag, Lat Ramus)    Thrombectomy    The clot was removed mechanically. Passes taken: 2. Total fluid removal: 10 mL. Total duration: 60 sec. Pre YAIR flow was 1. Post YAIR flow is 3. Supplies used: GDWIRE COR STR ASAHI 7.915H931 -- ; CATH THROMCTMY TBNG 5F 140CM -- PENUMBRA INDIGO CATRX    Thrombectomy    The clot was removed mechanically. Passes taken: 1. Total fluid removal: 10 mL. Supplies used: CATH THROMCTMY TBNG 5F 140CM -- PENUMBRA INDIGO CATRX            Prior to Admission medications    Medication Sig Start Date End Date Taking? Authorizing Provider   amLODIPine (NORVASC) 10 mg tablet Take  by mouth daily. Yes Provider, Historical   omega 3-dha-epa-fish oil (FISH OIL) 100-160-1,000 mg cap Take  by mouth. Yes Provider, Historical   diclofenac (VOLTAREN) 1 % gel Apply  to affected area four (4) times daily. Yes Provider, Historical   losartan (COZAAR) 100 mg tablet Take 50 mg by mouth two (2) times a day. Yes Provider, Historical   Hydrochlorothiazide (HYDRODIURIL) 12.5 mg tablet Take 12.5 mg by mouth daily. Yes Provider, Historical   atorvastatin (LIPITOR) 20 mg tablet Take  by mouth daily. Yes Provider, Historical   Aspirin, Buffered 81 mg tab Take  by mouth daily.    Yes Provider, Historical     Recent Results (from the past 24 hour(s))   METABOLIC PANEL, BASIC    Collection Time: 08/29/20 12:31 AM   Result Value Ref Range    Sodium 141 136 - 145 mmol/L    Potassium 3.8 3.5 - 5.1 mmol/L    Chloride 111 (H) 97 - 108 mmol/L    CO2 25 21 - 32 mmol/L    Anion gap 5 5 - 15 mmol/L    Glucose 109 (H) 65 - 100 mg/dL    BUN 20 6 - 20 MG/DL    Creatinine 1.18 0.70 - 1.30 MG/DL    BUN/Creatinine ratio 17 12 - 20      GFR est AA >60 >60 ml/min/1.73m2    GFR est non-AA >60 >60 ml/min/1.73m2    Calcium 8.1 (L) 8.5 - 10.1 MG/DL   PTT    Collection Time: 08/29/20 12:31 AM   Result Value Ref Range    aPTT 64.7 (H) 22.1 - 32.0 sec    aPTT, therapeutic range     58.0 - 77.0 SECS   PTT    Collection Time: 08/29/20  6:29 AM   Result Value Ref Range    aPTT 77.4 (H) 22.1 - 32.0 sec    aPTT, therapeutic range     58.0 - 77.0 SECS   CBC WITH AUTOMATED DIFF    Collection Time: 08/29/20  6:29 AM   Result Value Ref Range    WBC 8.1 4.1 - 11.1 K/uL    RBC 3.54 (L) 4.10 - 5.70 M/uL    HGB 11.4 (L) 12.1 - 17.0 g/dL    HCT 34.4 (L) 36.6 - 50.3 %    MCV 97.2 80.0 - 99.0 FL    MCH 32.2 26.0 - 34.0 PG    MCHC 33.1 30.0 - 36.5 g/dL    RDW 13.0 11.5 - 14.5 %    PLATELET 853 764 - 238 K/uL    MPV 10.7 8.9 - 12.9 FL    NRBC 0.0 0  WBC    ABSOLUTE NRBC 0.00 0.00 - 0.01 K/uL    NEUTROPHILS 80 (H) 32 - 75 %    LYMPHOCYTES 9 (L) 12 - 49 %    MONOCYTES 10 5 - 13 %    EOSINOPHILS 1 0 - 7 %    BASOPHILS 0 0 - 1 %    IMMATURE GRANULOCYTES 0 0.0 - 0.5 %    ABS. NEUTROPHILS 6.5 1.8 - 8.0 K/UL    ABS. LYMPHOCYTES 0.7 (L) 0.8 - 3.5 K/UL    ABS. MONOCYTES 0.8 0.0 - 1.0 K/UL    ABS. EOSINOPHILS 0.1 0.0 - 0.4 K/UL    ABS. BASOPHILS 0.0 0.0 - 0.1 K/UL    ABS. IMM.  GRANS. 0.0 0.00 - 0.04 K/UL    DF SMEAR SCANNED      RBC COMMENTS NORMOCYTIC, NORMOCHROMIC     ECHO ADULT COMPLETE    Collection Time: 08/29/20 12:49 PM   Result Value Ref Range    IVSd 1.22 (A) 0.6 - 1.0 cm    LVIDd 3.28 (A) 4.2 - 5.9 cm    LVIDs 3.01 cm    LVOT d 1.83 cm    LVPWd 0.82 0.6 - 1.0 cm    LVOT Cardiac Output 4.73 liter/minute    LVOT Peak Gradient 6.97 mmHg    LVOT Peak Gradient 6.60 mmHg    Left Ventricular Outflow Tract Mean Gradient 3.09 mmHg    LVOT SV 76.6 mL    LVOT Peak Velocity 128.46 cm/s    LVOT Peak Velocity 132.00 cm/s    LVOT VTI 29.15 cm    RVSP 41.96 mmHg    Left Atrium Major Axis 4.24 cm LA Area 4C 18.86 cm2    LA Vol 4C 52.04 18 - 58 mL    Left Atrium to Aortic Root Ratio 2.19     Right Atrial Area 4C 19.75 cm2    Est. RA Pressure 10.00 mmHg    AoV PG 13.69 mmHg    Pulmonic Regurgitant End Max Velocity 181.97 cm/s    Aortic Valve Area by Continuity of Peak Velocity 1.91 cm2    Aortic Valve Area by Continuity of Peak Velocity 1.85 cm2    Aortic Regurgitant Pressure Half-time 0.30 s    AR Max Krzysztof 388.64 cm/s    MV A Krzysztof 74.18 cm/s    Mitral Valve E Wave Deceleration Time 0.20 s    MV E Krzysztof 97.00 cm/s    E/E' ratio (averaged) 11.46     E/E' lateral 10.08     E/E' septal 12.83     LV E' Lateral Velocity 9.62 cm/s    LV E' Septal Velocity 7.56 cm/s    MVA VTI 2.56 cm2    MV Peak Gradient 5.09 mmHg    MV Mean Gradient 1.40 mmHg    Mitral Valve Max Velocity 112.84 cm/s    Mitral Valve Annulus Velocity Time Integral 29.90 cm    Pulmonic Regurgitant End Max Velocity 514.21 cm/s    Mitral Regurgitant Velocity Time Integral 190.55 cm    Triscuspid Valve Regurgitation Peak Gradient 31.96 mmHg    TR Max Velocity 281.89 cm/s    Ao Root D 2.86 cm    MV E/A 1.31     LV Mass AL 96.5 88 - 224 g    LV Mass AL Index 47.3 49 - 115 g/m2    Left Atrium Minor Axis 2.08 cm    LA Vol Index 25.51 16 - 28 ml/m2         Brittny Mcduffie MD

## 2020-08-29 NOTE — PROGRESS NOTES
I reviewed pertinent labs and imaging, and discussed /agreed on the plan of care with Dr. Gerardo Betancur. Hospitalist Progress Note    NAME: Babak Momin   :  1950   MRN:  [id number]     Assessment / Plan:  NSTEMI, POA   CAD, s/p CABG in   · S/p cardiac cath yesterday showing severe two-vessel native coronary artery disease involving the LAD, left circumflex and left main. Mild disease in the native right coronary artery, nonfunctional left internal mammary graft to the LAD. · Extensive thrombus found on cath, continue Heparin IV and plan for re-cath early next week. · Continue statin and ASA  · Continue Brilinta   · Continue nitro gtt   · Appreciate cardiology input     HTN  · Continue amlodipine     PAF  Hypercoagulable state secondary to afib  · ECHO pending     25.0 - 29.9 Overweight / Body mass index is 25.8 kg/m². Code status: Full  Prophylaxis: Heparin gtt  Recommended Disposition: Home w/Family     Subjective:     Chief Complaint / Reason for Physician Visit  \"I feel fine\". Patient denies any new complaints. States plan is for cath on Monday. Discussed with RN events overnight. Review of Systems:  Symptom Y/N Comments  Symptom Y/N Comments   Fever/Chills n   Chest Pain n    Poor Appetite n   Edema n    Cough n   Abdominal Pain n    Sputum n   Joint Pain n    SOB/LARSON n   Pruritis/Rash n    Nausea/vomit n   Tolerating PT/OT     Diarrhea n   Tolerating Diet     Constipation n   Other       Could NOT obtain due to:      Objective:     VITALS:   Last 24hrs VS reviewed since prior progress note.  Most recent are:  Patient Vitals for the past 24 hrs:   Temp Pulse Resp BP SpO2   20 0843 98.2 °F (36.8 °C) 67 20 95/45 96 %   20 0400 98.3 °F (36.8 °C) 67 20 108/44 95 %   20 2300 98.2 °F (36.8 °C) 61 12 105/45 98 %   20 2200  65 11 132/52 98 %   20 2100  64 13 107/47 96 %   20 2030  69 18 125/52 98 %   20 2000  70 15 107/59 97 %   20 1945  76 21 105/55    08/28/20 1930  66 16 99/56 97 %   08/28/20 1915 97.8 °F (36.6 °C) 66 15 104/59 98 %   08/28/20 1900  66 13 103/59 96 %   08/28/20 1850  69 18  96 %   08/28/20 1845  67 13 112/55 97 %   08/28/20 1830  67 14  96 %   08/28/20 1815  66 18 118/58 96 %   08/28/20 1800  68 19 121/63 95 %   08/28/20 1745  66 13 122/63 97 %   08/28/20 1730  67 17 113/61 97 %   08/28/20 1715  68 12 114/66 98 %   08/28/20 1700  65 12 115/59 97 %   08/28/20 1645  65 18 118/60 98 %   08/28/20 1630  63 15 107/59 97 %   08/28/20 1615  64 18 111/57 97 %   08/28/20 1600  64 14 112/69 97 %   08/28/20 1545  63 15 115/59 95 %   08/28/20 1530  63 12 110/64 97 %   08/28/20 1515  63 13 119/59 97 %   08/28/20 1500 97.4 °F (36.3 °C) 63 15 119/57 98 %   08/28/20 1445  62 13 113/83 98 %   08/28/20 1430  61 14 107/61 97 %   08/28/20 1415  61 15 107/67 95 %   08/28/20 1400  61 16 105/55 93 %   08/28/20 1345  61 14 105/54 91 %   08/28/20 1331 97.7 °F (36.5 °C) 60 14 109/61 93 %       Intake/Output Summary (Last 24 hours) at 8/29/2020 1017  Last data filed at 8/29/2020 0913  Gross per 24 hour   Intake 6275.75 ml   Output 1150 ml   Net 5125.75 ml        PHYSICAL EXAM:  General: Pleasant elderly  male. NAD    EENT:  EOMI. Anicteric sclerae. MMM  Resp:  CTA bilaterally, no wheezing or rales. No accessory muscle use  CV:  Regular rate rhythm,  1+ BLE edema  GI:  Soft, Non distended, Non tender.  +Bowel sounds  Neurologic:  Alert and oriented X 3, normal speech,   Psych:   Good insight. Not anxious nor agitated  Skin:  No rashes.   No jaundice    Reviewed most current lab test results and cultures  YES  Reviewed most current radiology test results   YES  Review and summation of old records today    NO  Reviewed patient's current orders and MAR    YES  PMH/SH reviewed - no change compared to H&P  ________________________________________________________________________  Care Plan discussed with:    Comments   Patient x    Family RN x    Care Manager     Consultant                        Multidiciplinary team rounds were held today with , nursing, pharmacist and clinical coordinator. Patient's plan of care was discussed; medications were reviewed and discharge planning was addressed. ________________________________________________________________________  Total NON critical care TIME:  26   Minutes    Total CRITICAL CARE TIME Spent:   Minutes non procedure based      Comments   >50% of visit spent in counseling and coordination of care     ________________________________________________________________________  Maci Ludwig NP     Procedures: see electronic medical records for all procedures/Xrays and details which were not copied into this note but were reviewed prior to creation of Plan. LABS:  I reviewed today's most current labs and imaging studies.   Pertinent labs include:  Recent Labs     08/29/20  0629 08/28/20  0420   WBC 8.1 6.3   HGB 11.4* 12.6   HCT 34.4* 37.9    173     Recent Labs     08/29/20  0031 08/28/20  0420    138   K 3.8 3.9   * 110*   CO2 25 25   * 147*   BUN 20 25*   CREA 1.18 1.59*   CA 8.1* 8.0*   INR  --  1.1       Signed: Maci Ludwig NP

## 2020-08-29 NOTE — PROGRESS NOTES
13:53 - SBAR report rec'd from Socorro Mccollum, [facility]. Nitro gtt infusing at 20mcg/min (do not titrate); heparin gtt infusing at 12 units/kg/hr. Next PTT due in AM.      14:15 - Assessment completed. Denies CP/SOB. 19:00 - Bedside report given to Candido Morales RN.

## 2020-08-29 NOTE — PROCEDURES
46 [facility] CATH    Name:  Marylen Gravely  MR#:  [id number]  :  1950  ACCOUNT #:  [de-identified]  DATE OF SERVICE:  2020    PROCEDURES PERFORMED:  Left heart catheterization, coronary graft angiography, left ventriculography. SURGEON:  Clotilde Banks MD    ESTIMATED BLOOD LOSS:  Less than 20 mL. SPECIMENS REMOVED:  None. COMPLICATIONS:  None. ANESTHESIA:  Local with conscious sedation. INDICATION:  Non-ST-elevation myocardial infarction. TECHNIQUE:  Right femoral artery via Mann. CATHETERS USED:  5-Mongolian pigtail, 5-Mongolian JL4, 5-Mongolian JR4. MEDICATIONS USED:  Please see the separate cath lab log sheet. FINDINGS:  1. Hemodynamics:  Aortic pressure was 114/60 with a mean of 81. Left ventricular pressure was 110/4 with an LVEDP of 15. There was no significant gradient on pullback across the aortic valve. 2.  Left ventriculography done from the SCHMITZ view revealed a normal-sized left ventricle with mild-to-moderate LV systolic dysfunction. There was anterior and anterolateral hypokinesis which was mild as well as mild mid inferior hypokinesis. Overall EF was 35%-40%. No significant mitral regurgitation was seen. 3.  Coronary graft angiography reveals severe left main and two-vessel coronary artery disease involving the entire left coronary artery. The left main had a 90% distal stenosis. The LAD was totally occluded proximally in the left circumflex and a subtotal ostial stenosis. The right coronary artery surprisingly had only mild disease and was a moderate-sized dominant vessel. It had diffuse mild irregularities and bifurcated into a fairly small PDA and large posterolateral branch without significant disease. There were three bypass grafts. The left internal mammary to the LAD was nonfunctional and was very small and appeared underdeveloped. There did appear to be floating on the body of this graft, but it did not feel the LAD. The saphenous vein graft to the distal marginal branch was patent and free of significant disease. There was a 50% proximal area of narrowing in the body of this graft and the insertion site had no significant disease. The marginal branch was a distal marginal branch, which was moderate in size and had diffuse coarse irregularities. The Y saphenous vein graft to the ramus intermedius and diagonal vessels were patent. The limb of the saphenous vein graft to the diagonal was patent and free of significant disease. From the  large diagonal branch  there was backfilling of the LAD, which appeared to be a large vessel. The entire LAD filled via this diagonal branch and extended to the apex. There were no high-grade stenosis in the native LAD. This was beyond the occluded area in the proximal LAD. The branch of the Y graft to the ramus intermedius was filled with a large filling defect consistent with thrombus. The entire body of this graft appeared to be filled with thrombus and it attached to a moderate to large ramus intermedius vessel without apparent significant disease. There was flow down in this graft to the ramus intermedius vessel. CONCLUSIONS:  1. Severe two-vessel native coronary artery disease involving the LAD, left circumflex and left main. Mild disease in the native right coronary artery. 2.  Nonfunctional left internal mammary graft to the LAD. 3.  Patent saphenous vein graft to the distal marginal vessel without significant disease. 4.  Patent saphenous vein Y graft to a large diagonal branch and the ramus intermedius vessel. The limb of this graft to the ramus intermedius had extensive filling defect present consistent with thrombus. 5.  Mild-to-moderate LV systolic dysfunction with wall motion abnormalities as described above. 6.  Normal resting LVEDP.     RECOMMENDATIONS:  The patient will be considered for percutaneous intervention on the vein graft to the ramus intermedius, which will be reported separately.         MD BRIA [email or web address]  D:  08/28/2020 16:45  T:  08/29/2020 3:47  JOB #:  [id number]  CC:  Ernestina Bee MD

## 2020-08-29 NOTE — PROGRESS NOTES
Problem: Falls - Risk of  Goal: *Absence of Falls  Description: Document Calixto Artis Fall Risk and appropriate interventions in the flowsheet.   Outcome: Progressing Towards Goal  Note: Fall Risk Interventions:            Medication Interventions: Patient to call before getting OOB, Teach patient to arise slowly                   Problem: Patient Education: Go to Patient Education Activity  Goal: Patient/Family Education  Outcome: Progressing Towards Goal     Problem: Patient Education: Go to Patient Education Activity  Goal: Patient/Family Education  Outcome: Progressing Towards Goal     Problem: Unstable angina/NSTEMI: Day of Admission/Day 1  Goal: Off Pathway (Use only if patient is Off Pathway)  Outcome: Progressing Towards Goal  Goal: Activity/Safety  Outcome: Progressing Towards Goal  Goal: Consults, if ordered  Outcome: Progressing Towards Goal  Goal: Diagnostic Test/Procedures  Outcome: Progressing Towards Goal  Goal: Nutrition/Diet  Outcome: Progressing Towards Goal  Goal: Discharge Planning  Outcome: Progressing Towards Goal  Goal: Medications  Outcome: Progressing Towards Goal  Goal: Respiratory  Outcome: Progressing Towards Goal  Goal: Treatments/Interventions/Procedures  Outcome: Progressing Towards Goal  Goal: Psychosocial  Outcome: Progressing Towards Goal  Goal: *Hemodynamically stable  Outcome: Progressing Towards Goal  Goal: *Optimal pain control at patient's stated goal  Outcome: Progressing Towards Goal  Goal: *Lungs clear or at baseline  Outcome: Progressing Towards Goal     Problem: Unstable angina/NSTEMI: Day of Admission/Day 1  Goal: Activity/Safety  Outcome: Progressing Towards Goal     Problem: Unstable angina/NSTEMI: Day of Admission/Day 1  Goal: Diagnostic Test/Procedures  Outcome: Progressing Towards Goal     Problem: Unstable angina/NSTEMI: Day of Admission/Day 1  Goal: Consults, if ordered  Outcome: Progressing Towards Goal     Problem: Unstable angina/NSTEMI: Day of Admission/Day 1  Goal: Activity/Safety  Outcome: Progressing Towards Goal

## 2020-08-29 NOTE — ROUTINE PROCESS
1915-  Rec'd bedside report from [facility]. Pt resting in bed at 15 degree angle. HOB elevated to 30 degree angle. Right steve cath site inspected with off-going nurse. Dressing CDI. Noted Bruising to right groin cath site. Pt has NTG gtt at 20 mcg/ min infusing in to PIV in left hand without difficulty. NS @ 100 ml/ hr and Heparin gtt @ 12 units/ kg/ hr infusing via PIV in the right AC. Noted pt's gown damp on right side as well as his arm. Off going nurse stated pt was not wet at 1830. Will inspect IV site after pt eating per pt's request. 
 
2010-  Pt ate 75% of meal.  Noted that the IV in right AC was leaking due to connector being loose. PIV dressing taken down all the way to hub. Hub tightened. No further leakage noted. Gown and damp linen changed. Tolerated well. 0030-  PTT drawn. Results received  Current PTT= 64.7. No change in Heparin drip. Rate will remain at 12 units/ kg/ hour. Informed pt that he is off bedrest at this time. Pt refused to get out of bed at this time. Encouraged pt to move around in bed. Pt verbalized understanding. Will continue to monitor. 0500-  Assisted pt out of bed. Pt ambulated around bed and across room to chair. Tolerated well. Virgil continue to monitor.

## 2020-08-30 LAB
ANION GAP SERPL CALC-SCNC: 4 MMOL/L (ref 5–15)
APTT PPP: 59.3 SEC (ref 22.1–32)
APTT PPP: 64.4 SEC (ref 22.1–32)
APTT PPP: 85.3 SEC (ref 22.1–32)
BUN SERPL-MCNC: 19 MG/DL (ref 6–20)
BUN/CREAT SERPL: 15 (ref 12–20)
CALCIUM SERPL-MCNC: 7.7 MG/DL (ref 8.5–10.1)
CHLORIDE SERPL-SCNC: 111 MMOL/L (ref 97–108)
CO2 SERPL-SCNC: 24 MMOL/L (ref 21–32)
COMMENT, HOLDF: NORMAL
CREAT SERPL-MCNC: 1.27 MG/DL (ref 0.7–1.3)
ECHO AO ROOT DIAM: 2.86 CM
ECHO AR MAX VEL PISA: 388.64 CM/S
ECHO AV AREA PEAK VELOCITY: 1.85 CM2
ECHO AV AREA PEAK VELOCITY: 1.91 CM2
ECHO AV PEAK GRADIENT: 13.69 MMHG
ECHO AV REGURGITANT PHT: 0.3 S
ECHO EST RA PRESSURE: 10 MMHG
ECHO LA AREA 4C: 18.86 CM2
ECHO LA MAJOR AXIS: 4.24 CM
ECHO LA MINOR AXIS: 2.08 CM
ECHO LA TO AORTIC ROOT RATIO: 2.19
ECHO LA VOL 4C: 52.04 ML (ref 18–58)
ECHO LA VOLUME INDEX A4C: 25.51 ML/M2 (ref 16–28)
ECHO LV E' LATERAL VELOCITY: 9.62 CM/S
ECHO LV E' SEPTAL VELOCITY: 7.56 CM/S
ECHO LV INTERNAL DIMENSION DIASTOLIC: 3.28 CM (ref 4.2–5.9)
ECHO LV INTERNAL DIMENSION SYSTOLIC: 3.01 CM
ECHO LV IVSD: 1.22 CM (ref 0.6–1)
ECHO LV MASS 2D: 96.5 G (ref 88–224)
ECHO LV MASS INDEX 2D: 47.3 G/M2 (ref 49–115)
ECHO LV POSTERIOR WALL DIASTOLIC: 0.82 CM (ref 0.6–1)
ECHO LVOT CARDIAC OUTPUT: 4.73 LITER/MINUTE
ECHO LVOT DIAM: 1.83 CM
ECHO LVOT PEAK GRADIENT: 6.6 MMHG
ECHO LVOT PEAK GRADIENT: 6.97 MMHG
ECHO LVOT PEAK VELOCITY: 128.46 CM/S
ECHO LVOT PEAK VELOCITY: 132 CM/S
ECHO LVOT SV: 76.6 ML
ECHO LVOT VTI: 29.15 CM
ECHO MV A VELOCITY: 74.18 CM/S
ECHO MV AREA VTI: 2.56 CM2
ECHO MV E DECELERATION TIME (DT): 0.2 S
ECHO MV E VELOCITY: 97 CM/S
ECHO MV E/A RATIO: 1.31
ECHO MV E/E' LATERAL: 10.08
ECHO MV E/E' RATIO (AVERAGED): 11.46
ECHO MV E/E' SEPTAL: 12.83
ECHO MV MAX VELOCITY: 112.84 CM/S
ECHO MV MEAN GRADIENT: 1.4 MMHG
ECHO MV PEAK GRADIENT: 5.09 MMHG
ECHO MV REGURGITANT VTIA: 190.55 CM
ECHO MV VTI: 29.9 CM
ECHO PV REGURGITANT MAX VELOCITY: 181.97 CM/S
ECHO PV REGURGITANT MAX VELOCITY: 514.21 CM/S
ECHO RA AREA 4C: 19.75 CM2
ECHO RIGHT VENTRICULAR SYSTOLIC PRESSURE (RVSP): 41.96 MMHG
ECHO TV REGURGITANT MAX VELOCITY: 281.89 CM/S
ECHO TV REGURGITANT PEAK GRADIENT: 31.96 MMHG
GLUCOSE SERPL-MCNC: 86 MG/DL (ref 65–100)
LVOT MG: 3.09 MMHG
POTASSIUM SERPL-SCNC: 3.5 MMOL/L (ref 3.5–5.1)
SAMPLES BEING HELD,HOLD: NORMAL
SODIUM SERPL-SCNC: 139 MMOL/L (ref 136–145)
THERAPEUTIC RANGE,PTTT: ABNORMAL SECS (ref 58–77)

## 2020-08-30 PROCEDURE — 85730 THROMBOPLASTIN TIME PARTIAL: CPT

## 2020-08-30 PROCEDURE — 65660000000 HC RM CCU STEPDOWN

## 2020-08-30 PROCEDURE — 74011250637 HC RX REV CODE- 250/637: Performed by: INTERNAL MEDICINE

## 2020-08-30 PROCEDURE — 80048 BASIC METABOLIC PNL TOTAL CA: CPT

## 2020-08-30 PROCEDURE — 36415 COLL VENOUS BLD VENIPUNCTURE: CPT

## 2020-08-30 RX ADMIN — AMLODIPINE BESYLATE 5 MG: 5 TABLET ORAL at 08:49

## 2020-08-30 RX ADMIN — ASPIRIN 81 MG CHEWABLE TABLET 81 MG: 81 TABLET CHEWABLE at 08:49

## 2020-08-30 RX ADMIN — ATORVASTATIN CALCIUM 40 MG: 40 TABLET, FILM COATED ORAL at 21:08

## 2020-08-30 RX ADMIN — TICAGRELOR 90 MG: 90 TABLET ORAL at 18:18

## 2020-08-30 RX ADMIN — Medication 10 ML: at 13:32

## 2020-08-30 RX ADMIN — Medication 10 ML: at 06:10

## 2020-08-30 RX ADMIN — TICAGRELOR 90 MG: 90 TABLET ORAL at 08:49

## 2020-08-30 RX ADMIN — PANTOPRAZOLE SODIUM 40 MG: 40 TABLET, DELAYED RELEASE ORAL at 08:49

## 2020-08-30 RX ADMIN — Medication 10 ML: at 21:08

## 2020-08-30 NOTE — PROGRESS NOTES
Problem: Falls - Risk of  Goal: *Absence of Falls  Description: Document Wilfredo West Fall Risk and appropriate interventions in the flowsheet.   Outcome: Progressing Towards Goal  Note: Fall Risk Interventions:            Medication Interventions: Assess postural VS orthostatic hypotension, Evaluate medications/consider consulting pharmacy, Patient to call before getting OOB, Teach patient to arise slowly                   Problem: Patient Education: Go to Patient Education Activity  Goal: Patient/Family Education  Outcome: Progressing Towards Goal     Problem: Patient Education: Go to Patient Education Activity  Goal: Patient/Family Education  Outcome: Progressing Towards Goal     Problem: Unstable angina/NSTEMI: Day of Admission/Day 1  Goal: Off Pathway (Use only if patient is Off Pathway)  Outcome: Progressing Towards Goal  Goal: Activity/Safety  Outcome: Progressing Towards Goal  Goal: Consults, if ordered  Outcome: Progressing Towards Goal  Goal: Diagnostic Test/Procedures  Outcome: Progressing Towards Goal  Goal: Nutrition/Diet  Outcome: Progressing Towards Goal  Goal: Discharge Planning  Outcome: Progressing Towards Goal  Goal: Medications  Outcome: Progressing Towards Goal  Goal: Respiratory  Outcome: Progressing Towards Goal  Goal: Treatments/Interventions/Procedures  Outcome: Progressing Towards Goal  Goal: Psychosocial  Outcome: Progressing Towards Goal  Goal: *Hemodynamically stable  Outcome: Progressing Towards Goal  Goal: *Optimal pain control at patient's stated goal  Outcome: Progressing Towards Goal  Goal: *Lungs clear or at baseline  Outcome: Progressing Towards Goal     Problem: Unstable Angina/NSTEMI: Discharge Outcomes  Goal: *Hemodynamically stable  Outcome: Progressing Towards Goal  Goal: *Stable cardiac rhythm  Outcome: Progressing Towards Goal  Goal: *Lungs clear or at baseline  Outcome: Progressing Towards Goal  Goal: *Optimal pain control at patient's stated goal  Outcome: Progressing Towards Goal  Goal: *Identifies cardiac risk factors  Outcome: Progressing Towards Goal  Goal: *Verbalizes home exercise program, activity guidelines, cardiac precautions  Outcome: Progressing Towards Goal  Goal: *Verbalizes understanding and describes prescribed diet  Outcome: Progressing Towards Goal  Goal: *Verbalizes name, dosage, time, side effects, and number of days to continue medications  Outcome: Progressing Towards Goal  Goal: *Anxiety reduced or absent  Outcome: Progressing Towards Goal  Goal: *Understands and describes signs and symptoms to report to providers(Stroke Metric)  Outcome: Progressing Towards Goal  Goal: *Describes follow-up/return visits to physicians  Outcome: Progressing Towards Goal  Goal: *Describes available resources and support systems  Outcome: Progressing Towards Goal  Goal: *Influenza immunization  Outcome: Progressing Towards Goal  Goal: *Pneumococcal immunization  Outcome: Progressing Towards Goal  Goal: *Describes smoking cessation resources  Outcome: Progressing Towards Goal     Problem: Pain  Goal: *Control of Pain  Outcome: Progressing Towards Goal  Goal: *PALLIATIVE CARE:  Alleviation of Pain  Outcome: Progressing Towards Goal     Problem: Patient Education: Go to Patient Education Activity  Goal: Patient/Family Education  Outcome: Progressing Towards Goal     Problem: Pressure Injury - Risk of  Goal: *Prevention of pressure injury  Description: Document Edwin Scale and appropriate interventions in the flowsheet.   Outcome: Progressing Towards Goal     Problem: Patient Education: Go to Patient Education Activity  Goal: Patient/Family Education  Outcome: Progressing Towards Goal     Problem: Patient Education: Go to Patient Education Activity  Goal: Patient/Family Education  Outcome: Progressing Towards Goal     Problem: Cath Lab Procedures: Post-Cath Day 1  Goal: Off Pathway (Use only if patient is Off Pathway)  Outcome: Progressing Towards Goal  Goal: Activity/Safety  Outcome: Progressing Towards Goal  Goal: Diagnostic Test/Procedures  Outcome: Progressing Towards Goal  Goal: Nutrition/Diet  Outcome: Progressing Towards Goal  Goal: Discharge Planning  Outcome: Progressing Towards Goal  Goal: Medications  Outcome: Progressing Towards Goal  Goal: Respiratory  Outcome: Progressing Towards Goal  Goal: Treatments/Interventions/Procedures  Outcome: Progressing Towards Goal  Goal: Psychosocial  Outcome: Progressing Towards Goal

## 2020-08-30 NOTE — PROGRESS NOTES
Bedside shift change report given to Aubrey Mistry (oncoming nurse) by Guy Shea RN (offgoing nurse).  Report included the following information SBAR, Kardex, Intake/Output, MAR, Recent Results, Cardiac Rhythm Paced, SR, Alarm Parameters  and Pre Procedure Checklist.

## 2020-08-30 NOTE — PROGRESS NOTES
Bedside shift change report given to Timothy Venegas RN (oncoming nurse) by Nuvia Nicholas RN (offgoing nurse). Report included the following information SBAR, Kardex, Procedure Summary, Intake/Output, MAR, Accordion, Recent Results, Med Rec Status, Cardiac Rhythm NSR, BBB, Paced, Alarm Parameters , Pre Procedure Checklist, Procedure Verification, Quality Measures and Dual Neuro Assessment. Pt. A&O x4, asymptomatic and stable vital signs. Right groin cath site dressing intact, no bleeding and hematoma.

## 2020-08-30 NOTE — PROGRESS NOTES
Bedside shift change report given to Pebbles Welch RN (oncoming nurse) by Temitope Ramirez RN (offgoing nurse). Report included the following information SBAR, Kardex, Procedure Summary, Intake/Output, MAR, Accordion, Recent Results, Med Rec Status, Cardiac Rhythm NSR, BBB, Paced, Alarm Parameters , Pre Procedure Checklist, Procedure Verification, Quality Measures and Dual Neuro Assessment. Pt. A&O x4, asymptomatic and stable vital signs. Right groin cath site dressing intact, no bleeding and hematoma.

## 2020-08-30 NOTE — PROGRESS NOTES
Problem: Falls - Risk of  Goal: *Absence of Falls  Description: Document Calixto Artis Fall Risk and appropriate interventions in the flowsheet.   Outcome: Progressing Towards Goal  Note: Fall Risk Interventions:            Medication Interventions: Assess postural VS orthostatic hypotension, Patient to call before getting OOB, Teach patient to arise slowly                   Problem: Patient Education: Go to Patient Education Activity  Goal: Patient/Family Education  Outcome: Progressing Towards Goal     Problem: Patient Education: Go to Patient Education Activity  Goal: Patient/Family Education  Outcome: Progressing Towards Goal     Problem: Unstable angina/NSTEMI: Day of Admission/Day 1  Goal: Off Pathway (Use only if patient is Off Pathway)  Outcome: Progressing Towards Goal  Goal: Activity/Safety  Outcome: Progressing Towards Goal  Goal: Consults, if ordered  Outcome: Progressing Towards Goal  Goal: Diagnostic Test/Procedures  Outcome: Progressing Towards Goal  Goal: Nutrition/Diet  Outcome: Progressing Towards Goal  Goal: Discharge Planning  Outcome: Progressing Towards Goal  Goal: Medications  Outcome: Progressing Towards Goal  Goal: Respiratory  Outcome: Progressing Towards Goal  Goal: Treatments/Interventions/Procedures  Outcome: Progressing Towards Goal  Goal: Psychosocial  Outcome: Progressing Towards Goal  Goal: *Hemodynamically stable  Outcome: Progressing Towards Goal  Goal: *Optimal pain control at patient's stated goal  Outcome: Progressing Towards Goal  Goal: *Lungs clear or at baseline  Outcome: Progressing Towards Goal     Problem: Unstable Angina/NSTEMI: Discharge Outcomes  Goal: *Hemodynamically stable  Outcome: Progressing Towards Goal  Goal: *Stable cardiac rhythm  Outcome: Progressing Towards Goal  Goal: *Lungs clear or at baseline  Outcome: Progressing Towards Goal  Goal: *Optimal pain control at patient's stated goal  Outcome: Progressing Towards Goal  Goal: *Identifies cardiac risk factors  Outcome: Progressing Towards Goal  Goal: *Verbalizes home exercise program, activity guidelines, cardiac precautions  Outcome: Progressing Towards Goal  Goal: *Verbalizes understanding and describes prescribed diet  Outcome: Progressing Towards Goal  Goal: *Verbalizes name, dosage, time, side effects, and number of days to continue medications  Outcome: Progressing Towards Goal  Goal: *Anxiety reduced or absent  Outcome: Progressing Towards Goal  Goal: *Understands and describes signs and symptoms to report to providers(Stroke Metric)  Outcome: Progressing Towards Goal  Goal: *Describes follow-up/return visits to physicians  Outcome: Progressing Towards Goal  Goal: *Describes available resources and support systems  Outcome: Progressing Towards Goal  Goal: *Influenza immunization  Outcome: Progressing Towards Goal  Goal: *Pneumococcal immunization  Outcome: Progressing Towards Goal  Goal: *Describes smoking cessation resources  Outcome: Progressing Towards Goal     Problem: Pain  Goal: *Control of Pain  Outcome: Progressing Towards Goal  Goal: *PALLIATIVE CARE:  Alleviation of Pain  Outcome: Progressing Towards Goal     Problem: Patient Education: Go to Patient Education Activity  Goal: Patient/Family Education  Outcome: Progressing Towards Goal     Problem: Pressure Injury - Risk of  Goal: *Prevention of pressure injury  Description: Document Edwin Scale and appropriate interventions in the flowsheet.   Outcome: Progressing Towards Goal  Note: Pressure Injury Interventions:                      Nutrition Interventions: Document food/fluid/supplement intake                     Problem: Patient Education: Go to Patient Education Activity  Goal: Patient/Family Education  Outcome: Progressing Towards Goal     Problem: Patient Education: Go to Patient Education Activity  Goal: Patient/Family Education  Outcome: Progressing Towards Goal     Problem: Cath Lab Procedures: Post-Cath Day 1  Goal: Off Pathway (Use only if patient is Off Pathway)  Outcome: Progressing Towards Goal  Goal: Activity/Safety  Outcome: Progressing Towards Goal  Goal: Diagnostic Test/Procedures  Outcome: Progressing Towards Goal  Goal: Nutrition/Diet  Outcome: Progressing Towards Goal  Goal: Discharge Planning  Outcome: Progressing Towards Goal  Goal: Medications  Outcome: Progressing Towards Goal  Goal: Respiratory  Outcome: Progressing Towards Goal  Goal: Treatments/Interventions/Procedures  Outcome: Progressing Towards Goal  Goal: Psychosocial  Outcome: Progressing Towards Goal

## 2020-08-30 NOTE — PROGRESS NOTES
I reviewed pertinent labs and imaging, and discussed /agreed on the plan of care with Dr. Angie Odell. Hospitalist Progress Note    NAME: Mackenzie Sin   :  1950   MRN:  [id number]     Assessment / Plan:  NSTEMI, POA   CAD, s/p CABG in   · S/p cardiac cath yesterday showing severe two-vessel native coronary artery disease involving the LAD, left circumflex and left main. Mild disease in the native right coronary artery, nonfunctional left internal mammary graft to the LAD. · Extensive thrombus found on cath, continue Heparin IV and plan for re-cath early next week. · Continue statin and ASA  · Continue Brilinta   · Continue nitro gtt   · Appreciate cardiology input     HTN  · Continue amlodipine     PAF  Hypercoagulable state secondary to afib  · ECHO with EF 50-55%     25.0 - 29.9 Overweight / Body mass index is 25.8 kg/m². Code status: Full  Prophylaxis: Heparin gtt  Recommended Disposition: Home w/Family     Subjective:     Chief Complaint / Reason for Physician Visit  No new complaints. \"I am bored. \"  Discussed with RN events overnight. Review of Systems:  Symptom Y/N Comments  Symptom Y/N Comments   Fever/Chills n   Chest Pain n    Poor Appetite n   Edema n    Cough n   Abdominal Pain n    Sputum n   Joint Pain n    SOB/LARSON n   Pruritis/Rash n    Nausea/vomit n   Tolerating PT/OT     Diarrhea n   Tolerating Diet     Constipation n   Other       Could NOT obtain due to:      Objective:     VITALS:   Last 24hrs VS reviewed since prior progress note.  Most recent are:  Patient Vitals for the past 24 hrs:   Temp Pulse Resp BP SpO2   20 0716 98.2 °F (36.8 °C) 64 17 116/46 97 %   20 0358 98.1 °F (36.7 °C) 65 18 100/51 96 %   20 2230 98.3 °F (36.8 °C) 70 18 117/52 97 %   20 1917 97.4 °F (36.3 °C) 70 18 102/52 98 %   20 1415 97.6 °F (36.4 °C) 67 20 132/53 94 %   20 1122 97.9 °F (36.6 °C) 67 20 123/56 96 %       Intake/Output Summary (Last 24 hours) at 2020 5338  Last data filed at 8/30/2020 0849  Gross per 24 hour   Intake 440 ml   Output 500 ml   Net -60 ml        PHYSICAL EXAM:  General: Pleasant elderly  male. NAD   EENT:  EOMI. Anicteric sclerae. MMM  Resp:  LS CTA. No accessory muscle use  CV:  RRR,  1+ BLE edema  GI:  Soft, Non distended, Non tender.  +Bowel sounds  Neurologic:  Alert and oriented X 3, normal speech,   Psych:   Good insight. Not anxious nor agitated  Skin:  No rashes. No jaundice    Reviewed most current lab test results and cultures  YES  Reviewed most current radiology test results   YES  Review and summation of old records today    NO  Reviewed patient's current orders and MAR    YES  PMH/SH reviewed - no change compared to H&P  ________________________________________________________________________  Care Plan discussed with:    Comments   Patient x    Family      RN x    Care Manager     Consultant                        Multidiciplinary team rounds were held today with , nursing, pharmacist and clinical coordinator. Patient's plan of care was discussed; medications were reviewed and discharge planning was addressed. ________________________________________________________________________  Total NON critical care TIME:  26   Minutes    Total CRITICAL CARE TIME Spent:   Minutes non procedure based      Comments   >50% of visit spent in counseling and coordination of care     ________________________________________________________________________  Rosalita Ades, NP     Procedures: see electronic medical records for all procedures/Xrays and details which were not copied into this note but were reviewed prior to creation of Plan. LABS:  I reviewed today's most current labs and imaging studies.   Pertinent labs include:  Recent Labs     08/29/20  0629 08/28/20  0420   WBC 8.1 6.3   HGB 11.4* 12.6   HCT 34.4* 37.9    173     Recent Labs     08/30/20  0355 08/29/20  0031 08/28/20  0420    141 138   K 3.5 3.8 3.9   * 111* 110*   CO2 24 25 25   GLU 86 109* 147*   BUN 19 20 25*   CREA 1.27 1.18 1.59*   CA 7.7* 8.1* 8.0*   INR  --   --  1.1       Signed: Jimi Bates NP

## 2020-08-30 NOTE — PROGRESS NOTES
Progress Note    NAME: Genny Contreras   :  1950   MRN:  [id number]     Date/Time:  2020    Patient PCP: Robyn Raines MD  Primary cardiologist:  Dr. Heather Markham   ________________________________________________________________________     Assessment:     1. Acute coronary syndrome, NSTEMI. Complicated PCI with heavy thrombus burden, see below. No chest pain today  2. Echo  with normal EF 55-60%, preliminarily  3. Mild to moderate AI   4. CAD s/p CABG in    5. HTN  6. HLP  7. Chronic renal insufficiency stage 3  8. pAfib   9. 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 Chronic RBBB   11. Full code        Plan:     1. Continue dual antiplatelet therapy. 2. Continue heparin infusion. 3. No change to cardiac medications otherwise. He remains chest pain-free. Echo  with good LV function, no evidence of actively compromised anterior wall circulation at rest.  No heart failure. No significant arrhythmias. Continue the current plan, possible re-cath on Monday by Dr. Jerome Martinez. Will defer to him to decide. NPO after MN just in case. [x]        High complexity decision making was performed    Subjective:     No chest pain, syncope, dizziness, palpitations. No nausea, vomiting, diaphoresis. No dyspnea at rest or on minimal exertion. He's on heparin infusion. REVIEW OF SYSTEMS:     3 systems reviewed, all systems review was negative except as noted above. Objective:      Physical Exam:    Last 24hrs VS reviewed since prior progress note.  Most recent are:    Visit Vitals  /54 (BP 1 Location: Left arm, BP Patient Position: At rest;Sitting)   Pulse 65   Temp 98.2 °F (36.8 °C)   Resp 18   Ht 5' 11\" (1.803 m)   Wt 83.9 kg (185 lb)   SpO2 97%   BMI 25.80 kg/m²       Intake/Output Summary (Last 24 hours) at 2020 1207  Last data filed at 2020 0849  Gross per 24 hour   Intake 440 ml   Output 500 ml   Net -60 ml Examination:     General: Alert + Oriented x3, no acute distress   HEENT: Normocephalic aromatic, MMM   Neck: Supple, JVP- not well appreciated   RS: Non labored, clear, symmetric   CVS: Regular rate and rhythm, S1S2, no murmur   Abd: Soft, non tender, non distended   Lower extremity: Warm to touch, Edema- trace bilaterally   Skin: Warm and dry, No significant bruises or rash   CNS: Oriented x3, no focal neuro deficit         Data:      Telemetry:  No events    EKG in ER:  NSR, RBBB, ST depression TWI in V1-3      FINDINGS on cath 8/28:  1. Hemodynamics:  Aortic pressure was 114/60 with a mean of 81. Left ventricular pressure was 110/4 with an LVEDP of 15. There was no significant gradient on pullback across the aortic valve. 2.  Left ventriculography done from the SCHMITZ view revealed a normal-sized left ventricle with mild-to-moderate LV systolic dysfunction. There was anterior and anterolateral hypokinesis which was mild as well as mild mid inferior hypokinesis. Overall EF was 35%-40%. No significant mitral regurgitation was seen. 3.  Coronary graft angiography reveals severe left main and two-vessel coronary artery disease involving the entire left coronary artery. The left main had a 90% distal stenosis. The LAD was totally occluded proximally in the left circumflex and a subtotal ostial stenosis. The right coronary artery surprisingly had only mild disease and was a moderate-sized dominant vessel. It had diffuse mild irregularities and bifurcated into a fairly small PDA and large posterolateral branch without significant disease. There were three bypass grafts. The left internal mammary to the LAD was nonfunctional and was very small and appeared underdeveloped. There did appear to be floating on the body of this graft, but it did not feel the LAD. The saphenous vein graft to the distal marginal branch was patent and free of significant disease.   There was a 50% proximal area of narrowing in the body of this graft and the insertion site had no significant disease. The marginal branch was a distal marginal branch, which was moderate in size and had diffuse coarse irregularities. The Y saphenous vein graft to the ramus intermedius and diagonal vessels were patent. The limb of the saphenous vein graft to the diagonal was patent and free of significant disease. Noted to have large diagonal branch and there was backfilling of the LAD, which appeared to be a large vessel. The entire LAD filled via this diagonal branch and extended to the apex. There were no high-grade stenosis in the native LAD. This was beyond the occluded area in the proximal LAD. The branch of the Y graft to the ramus intermedius was filled with a large filling defect consistent with thrombus. The entire body of this graft appeared to be filled with thrombus and it attached to a moderate to large ramus intermedius vessel without apparent significant disease. There was flow down in this graft to the ramus intermedius vessel.     CONCLUSIONS:  1. Severe two-vessel native coronary artery disease involving the LAD, left circumflex and left main. Mild disease in the native right coronary artery. 2.  Nonfunctional left internal mammary graft to the LAD. 3.  Patent saphenous vein graft to the distal marginal vessel without significant disease. 4.  Patent saphenous vein Y graft to a large diagonal branch and the ramus intermedius vessel. The limb of this graft to the ramus intermedius had extensive filling defect present consistent with thrombus. 5.  Mild-to-moderate LV systolic dysfunction with wall motion abnormalities as described above. 6.  Normal resting LVEDP. PCI by Dr. Powell Span 8/28:  Prox Graft lesion (Sequential Graft to 1st Diag, Lat Ramus)    Thrombectomy    The clot was removed mechanically. Passes taken: 1. Total fluid removal: 10 mL. Total duration: 60 sec. Pre YAIR flow was 2.     Supplies used: [employer] TBNG 5F 140CM -- PENUMBRA INDIGO CATRX; GDWIRE ANGIO CLEMENTE 180CM STR TIP -- ASAHI DANIELLE    Thrombectomy    The clot was removed mechanically. Passes taken: 1. Total fluid removal: 10 mL. Total duration: 30 sec. Supplies used: CATH THROMCTMY TBNG 5F 140CM -- PENUMBRA INDIGO CATRX; GDWIRE ANGIO CLEMENTE 180CM STR TIP -- ASAHI DANIELLE    Post-Intervention Lesion Assessment       Prox Graft to Dist Graft lesion (Sequential Graft to 1st Diag, Lat Ramus)    Thrombectomy    The clot was removed mechanically. Passes taken: 2. Total fluid removal: 10 mL. Total duration: 60 sec. Pre YAIR flow was 1. Post YAIR flow is 3. Supplies used: GDWIRE COR STR ASAHI 7.154Z063 -- ; CATH THROMCTMY TBNG 5F 140CM -- PENUMBRA INDIGO CATRX    Thrombectomy    The clot was removed mechanically. Passes taken: 1. Total fluid removal: 10 mL. Supplies used: CATH THROMCTMY TBNG 5F 140CM -- PENUMBRA INDIGO CATRX            Prior to Admission medications    Medication Sig Start Date End Date Taking? Authorizing Provider   amLODIPine (NORVASC) 10 mg tablet Take  by mouth daily. Yes Provider, Historical   omega 3-dha-epa-fish oil (FISH OIL) 100-160-1,000 mg cap Take  by mouth. Yes Provider, Historical   diclofenac (VOLTAREN) 1 % gel Apply  to affected area four (4) times daily. Yes Provider, Historical   losartan (COZAAR) 100 mg tablet Take 50 mg by mouth two (2) times a day. Yes Provider, Historical   Hydrochlorothiazide (HYDRODIURIL) 12.5 mg tablet Take 12.5 mg by mouth daily. Yes Provider, Historical   atorvastatin (LIPITOR) 20 mg tablet Take  by mouth daily. Yes Provider, Historical   Aspirin, Buffered 81 mg tab Take  by mouth daily.    Yes Provider, Historical     Recent Results (from the past 24 hour(s))   ECHO ADULT COMPLETE    Collection Time: 08/29/20 12:49 PM   Result Value Ref Range    IVSd 1.22 (A) 0.6 - 1.0 cm    LVIDd 3.28 (A) 4.2 - 5.9 cm    LVIDs 3.01 cm    LVOT d 1.83 cm    LVPWd 0.82 0.6 - 1.0 cm    LVOT Cardiac Output 4.73 liter/minute    LVOT Peak Gradient 6.97 mmHg    LVOT Peak Gradient 6.60 mmHg    Left Ventricular Outflow Tract Mean Gradient 3.09 mmHg    LVOT SV 76.6 mL    LVOT Peak Velocity 128.46 cm/s    LVOT Peak Velocity 132.00 cm/s    LVOT VTI 29.15 cm    RVSP 41.96 mmHg    Left Atrium Major Axis 4.24 cm    LA Area 4C 18.86 cm2    LA Vol 4C 52.04 18 - 58 mL    Left Atrium to Aortic Root Ratio 2.19     Right Atrial Area 4C 19.75 cm2    Est. RA Pressure 10.00 mmHg    AoV PG 13.69 mmHg    Pulmonic Regurgitant End Max Velocity 181.97 cm/s    Aortic Valve Area by Continuity of Peak Velocity 1.91 cm2    Aortic Valve Area by Continuity of Peak Velocity 1.85 cm2    Aortic Regurgitant Pressure Half-time 0.30 s    AR Max Krzysztof 388.64 cm/s    MV A Krzysztof 74.18 cm/s    Mitral Valve E Wave Deceleration Time 0.20 s    MV E Krzysztof 97.00 cm/s    E/E' ratio (averaged) 11.46     E/E' lateral 10.08     E/E' septal 12.83     LV E' Lateral Velocity 9.62 cm/s    LV E' Septal Velocity 7.56 cm/s    MVA VTI 2.56 cm2    MV Peak Gradient 5.09 mmHg    MV Mean Gradient 1.40 mmHg    Mitral Valve Max Velocity 112.84 cm/s    Mitral Valve Annulus Velocity Time Integral 29.90 cm    Pulmonic Regurgitant End Max Velocity 514.21 cm/s    Mitral Regurgitant Velocity Time Integral 190.55 cm    Triscuspid Valve Regurgitation Peak Gradient 31.96 mmHg    TR Max Velocity 281.89 cm/s    Ao Root D 2.86 cm    MV E/A 1.31     LV Mass AL 96.5 88 - 224 g    LV Mass AL Index 47.3 49 - 115 g/m2    Left Atrium Minor Axis 2.08 cm    LA Vol Index 25.51 16 - 28 ml/m2   METABOLIC PANEL, BASIC    Collection Time: 08/30/20  3:55 AM   Result Value Ref Range    Sodium 139 136 - 145 mmol/L    Potassium 3.5 3.5 - 5.1 mmol/L    Chloride 111 (H) 97 - 108 mmol/L    CO2 24 21 - 32 mmol/L    Anion gap 4 (L) 5 - 15 mmol/L    Glucose 86 65 - 100 mg/dL    BUN 19 6 - 20 MG/DL    Creatinine 1.27 0.70 - 1.30 MG/DL    BUN/Creatinine ratio 15 12 - 20      GFR est AA >60 >60 ml/min/1.73m2    GFR est non-AA 56 (L) >60 ml/min/1.73m2    Calcium 7.7 (L) 8.5 - 10.1 MG/DL   PTT    Collection Time: 08/30/20  3:55 AM   Result Value Ref Range    aPTT 85.3 (H) 22.1 - 32.0 sec    aPTT, therapeutic range     58.0 - 77.0 SECS   SAMPLES BEING HELD    Collection Time: 08/30/20  3:55 AM   Result Value Ref Range    SAMPLES BEING HELD lav     COMMENT        Add-on orders for these samples will be processed based on acceptable specimen integrity and analyte stability, which may vary by analyte.          Jelly Pelayo MD

## 2020-08-31 LAB
ANION GAP SERPL CALC-SCNC: 5 MMOL/L (ref 5–15)
APTT PPP: 49.1 SEC (ref 22.1–32)
BUN SERPL-MCNC: 19 MG/DL (ref 6–20)
BUN/CREAT SERPL: 16 (ref 12–20)
CALCIUM SERPL-MCNC: 7.9 MG/DL (ref 8.5–10.1)
CHLORIDE SERPL-SCNC: 111 MMOL/L (ref 97–108)
CO2 SERPL-SCNC: 25 MMOL/L (ref 21–32)
CREAT SERPL-MCNC: 1.21 MG/DL (ref 0.7–1.3)
GLUCOSE SERPL-MCNC: 87 MG/DL (ref 65–100)
POTASSIUM SERPL-SCNC: 3.4 MMOL/L (ref 3.5–5.1)
SODIUM SERPL-SCNC: 141 MMOL/L (ref 136–145)
THERAPEUTIC RANGE,PTTT: ABNORMAL SECS (ref 58–77)

## 2020-08-31 PROCEDURE — 94760 N-INVAS EAR/PLS OXIMETRY 1: CPT

## 2020-08-31 PROCEDURE — 36415 COLL VENOUS BLD VENIPUNCTURE: CPT

## 2020-08-31 PROCEDURE — 77010033678 HC OXYGEN DAILY

## 2020-08-31 PROCEDURE — 77030019569 HC BND COMPR RAD TERU -B: Performed by: INTERNAL MEDICINE

## 2020-08-31 PROCEDURE — B2121ZZ FLUOROSCOPY OF SINGLE CORONARY ARTERY BYPASS GRAFT USING LOW OSMOLAR CONTRAST: ICD-10-PCS | Performed by: INTERNAL MEDICINE

## 2020-08-31 PROCEDURE — 74011250636 HC RX REV CODE- 250/636: Performed by: INTERNAL MEDICINE

## 2020-08-31 PROCEDURE — 77030010221 HC SPLNT WR POS TELE -B: Performed by: INTERNAL MEDICINE

## 2020-08-31 PROCEDURE — 74011000250 HC RX REV CODE- 250: Performed by: INTERNAL MEDICINE

## 2020-08-31 PROCEDURE — 77030004549 HC CATH ANGI DX PRF MRTM -A: Performed by: INTERNAL MEDICINE

## 2020-08-31 PROCEDURE — 77030008543 HC TBNG MON PRSS MRTM -A: Performed by: INTERNAL MEDICINE

## 2020-08-31 PROCEDURE — 77030030195 HC CATH ANGI DX PRF4 MRTM -A: Performed by: INTERNAL MEDICINE

## 2020-08-31 PROCEDURE — 74011000636 HC RX REV CODE- 636: Performed by: INTERNAL MEDICINE

## 2020-08-31 PROCEDURE — C1894 INTRO/SHEATH, NON-LASER: HCPCS | Performed by: INTERNAL MEDICINE

## 2020-08-31 PROCEDURE — 65660000000 HC RM CCU STEPDOWN

## 2020-08-31 PROCEDURE — 99152 MOD SED SAME PHYS/QHP 5/>YRS: CPT | Performed by: INTERNAL MEDICINE

## 2020-08-31 PROCEDURE — C1769 GUIDE WIRE: HCPCS | Performed by: INTERNAL MEDICINE

## 2020-08-31 PROCEDURE — 80048 BASIC METABOLIC PNL TOTAL CA: CPT

## 2020-08-31 PROCEDURE — 85730 THROMBOPLASTIN TIME PARTIAL: CPT

## 2020-08-31 PROCEDURE — 74011250637 HC RX REV CODE- 250/637: Performed by: INTERNAL MEDICINE

## 2020-08-31 PROCEDURE — 93455 CORONARY ART/GRFT ANGIO S&I: CPT | Performed by: INTERNAL MEDICINE

## 2020-08-31 PROCEDURE — 77030019698 HC SYR ANGI MDLON MRTM -A: Performed by: INTERNAL MEDICINE

## 2020-08-31 RX ORDER — SODIUM CHLORIDE 9 MG/ML
100 INJECTION, SOLUTION INTRAVENOUS CONTINUOUS
Status: DISPENSED | OUTPATIENT
Start: 2020-08-31 | End: 2020-08-31

## 2020-08-31 RX ORDER — MIDAZOLAM HYDROCHLORIDE 1 MG/ML
INJECTION, SOLUTION INTRAMUSCULAR; INTRAVENOUS AS NEEDED
Status: DISCONTINUED | OUTPATIENT
Start: 2020-08-31 | End: 2020-08-31 | Stop reason: HOSPADM

## 2020-08-31 RX ORDER — SODIUM CHLORIDE 0.9 % (FLUSH) 0.9 %
5-40 SYRINGE (ML) INJECTION EVERY 8 HOURS
Status: DISCONTINUED | OUTPATIENT
Start: 2020-08-31 | End: 2020-09-01 | Stop reason: HOSPADM

## 2020-08-31 RX ORDER — FENTANYL CITRATE 50 UG/ML
INJECTION, SOLUTION INTRAMUSCULAR; INTRAVENOUS AS NEEDED
Status: DISCONTINUED | OUTPATIENT
Start: 2020-08-31 | End: 2020-08-31 | Stop reason: HOSPADM

## 2020-08-31 RX ORDER — VERAPAMIL HYDROCHLORIDE 2.5 MG/ML
INJECTION, SOLUTION INTRAVENOUS AS NEEDED
Status: DISCONTINUED | OUTPATIENT
Start: 2020-08-31 | End: 2020-08-31 | Stop reason: HOSPADM

## 2020-08-31 RX ORDER — SODIUM CHLORIDE 9 MG/ML
100 INJECTION, SOLUTION INTRAVENOUS CONTINUOUS
Status: DISCONTINUED | OUTPATIENT
Start: 2020-08-31 | End: 2020-09-01

## 2020-08-31 RX ORDER — HEPARIN SODIUM 200 [USP'U]/100ML
INJECTION, SOLUTION INTRAVENOUS
Status: COMPLETED | OUTPATIENT
Start: 2020-08-31 | End: 2020-08-31

## 2020-08-31 RX ORDER — SODIUM CHLORIDE 0.9 % (FLUSH) 0.9 %
5-40 SYRINGE (ML) INJECTION AS NEEDED
Status: DISCONTINUED | OUTPATIENT
Start: 2020-08-31 | End: 2020-09-01 | Stop reason: HOSPADM

## 2020-08-31 RX ORDER — LIDOCAINE HYDROCHLORIDE 10 MG/ML
INJECTION, SOLUTION EPIDURAL; INFILTRATION; INTRACAUDAL; PERINEURAL AS NEEDED
Status: DISCONTINUED | OUTPATIENT
Start: 2020-08-31 | End: 2020-08-31 | Stop reason: HOSPADM

## 2020-08-31 RX ADMIN — ASPIRIN 81 MG CHEWABLE TABLET 81 MG: 81 TABLET CHEWABLE at 08:34

## 2020-08-31 RX ADMIN — TICAGRELOR 90 MG: 90 TABLET ORAL at 17:53

## 2020-08-31 RX ADMIN — HEPARIN SODIUM 10 UNITS/KG/HR: 10000 INJECTION, SOLUTION INTRAVENOUS at 00:11

## 2020-08-31 RX ADMIN — SODIUM CHLORIDE 100 ML/HR: 900 INJECTION, SOLUTION INTRAVENOUS at 19:55

## 2020-08-31 RX ADMIN — AMLODIPINE BESYLATE 5 MG: 5 TABLET ORAL at 08:34

## 2020-08-31 RX ADMIN — Medication 10 ML: at 05:32

## 2020-08-31 RX ADMIN — Medication 10 ML: at 22:29

## 2020-08-31 RX ADMIN — ACETAMINOPHEN 650 MG: 325 TABLET ORAL at 04:43

## 2020-08-31 RX ADMIN — HEPARIN SODIUM 2000 UNITS: 5000 INJECTION, SOLUTION INTRAVENOUS; SUBCUTANEOUS at 09:46

## 2020-08-31 RX ADMIN — ACETAMINOPHEN 650 MG: 325 TABLET ORAL at 19:55

## 2020-08-31 RX ADMIN — ATORVASTATIN CALCIUM 40 MG: 40 TABLET, FILM COATED ORAL at 22:29

## 2020-08-31 RX ADMIN — PANTOPRAZOLE SODIUM 40 MG: 40 TABLET, DELAYED RELEASE ORAL at 08:34

## 2020-08-31 RX ADMIN — TICAGRELOR 90 MG: 90 TABLET ORAL at 08:34

## 2020-08-31 RX ADMIN — Medication 10 ML: at 17:53

## 2020-08-31 RX ADMIN — HEPARIN SODIUM 12 UNITS/KG/HR: 10000 INJECTION, SOLUTION INTRAVENOUS at 09:48

## 2020-08-31 RX ADMIN — NITROGLYCERIN 20 MCG/MIN: 20 INJECTION INTRAVENOUS at 08:31

## 2020-08-31 RX ADMIN — SODIUM CHLORIDE 100 ML/HR: 900 INJECTION, SOLUTION INTRAVENOUS at 13:30

## 2020-08-31 NOTE — PROGRESS NOTES
Progress Note    NAME: Chandan Walls   :  1950   MRN:  [id number]     Date/Time:  2020    Patient PCP: Whit Hernandez MD  Primary cardiologist:  Dr. Alexa Correa   ________________________________________________________________________     Assessment:     1. Acute coronary syndrome, NSTEMI. Complicated PCI with heavy thrombus burden, see below. No chest pain today  2. Echo  with normal EF 55-60%, preliminarily  3. Mild to moderate AI   4. CAD s/p CABG in    5. HTN  6. HLP  7. Chronic renal insufficiency stage 3  8. pAfib   9. 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 Chronic RBBB   11. Full code      Did fine over weekend . No chest pain . Sitting in chair. Has been on Heparin and IV NTG . Reviewed cath findings and intervention with wife and patient. We will plan to take him back to cath lab and look at the grafts this afternoon , see if any further intervention needed. Femoral site is fine   Will go R Radial.         Plan:     1. Continue dual antiplatelet therapy. 2. Continue heparin infusion. 3. No change to cardiac medications otherwise. He remains chest pain-free. Echo  with good LV function, no evidence of actively compromised anterior wall circulation at rest.  No heart failure. No significant arrhythmias. Continue the current plan, possible re-cath on Monday by Dr. Melba Burris. Will defer to him to decide. NPO after MN just in case. [x]        High complexity decision making was performed    Subjective:     No chest pain, syncope, dizziness, palpitations. No nausea, vomiting, diaphoresis. No dyspnea at rest or on minimal exertion. He's on heparin infusion. REVIEW OF SYSTEMS:     3 systems reviewed, all systems review was negative except as noted above. Objective:      Physical Exam:    Last 24hrs VS reviewed since prior progress note.  Most recent are:    Visit Vitals  /49   Pulse 61   Temp 97.4 °F (36.3 °C)   Resp 16   Ht 5' 11\" (1.803 m)   Wt 87.3 kg (192 lb 7.4 oz)   SpO2 90%   BMI 26.84 kg/m²       Intake/Output Summary (Last 24 hours) at 8/31/2020 3028  Last data filed at 8/31/2020 0300  Gross per 24 hour   Intake 340 ml   Output 600 ml   Net -260 ml        Examination:     General: Alert + Oriented x3, no acute distress   HEENT: Normocephalic aromatic, MMM   Neck: Supple, JVP- not well appreciated   RS: Non labored, clear, symmetric   CVS: Regular rate and rhythm, S1S2, no murmur   Abd: Soft, non tender, non distended   Lower extremity: Warm to touch, Edema- trace bilaterally   Skin: Warm and dry, No significant bruises or rash   CNS: Oriented x3, no focal neuro deficit         Data:      Telemetry:  No events    EKG in ER:  NSR, RBBB, ST depression TWI in V1-3      FINDINGS on cath 8/28:  1. Hemodynamics:  Aortic pressure was 114/60 with a mean of 81. Left ventricular pressure was 110/4 with an LVEDP of 15. There was no significant gradient on pullback across the aortic valve. 2.  Left ventriculography done from the SCHMITZ view revealed a normal-sized left ventricle with mild-to-moderate LV systolic dysfunction. There was anterior and anterolateral hypokinesis which was mild as well as mild mid inferior hypokinesis. Overall EF was 35%-40%. No significant mitral regurgitation was seen. 3.  Coronary graft angiography reveals severe left main and two-vessel coronary artery disease involving the entire left coronary artery. The left main had a 90% distal stenosis. The LAD was totally occluded proximally in the left circumflex and a subtotal ostial stenosis. The right coronary artery surprisingly had only mild disease and was a moderate-sized dominant vessel. It had diffuse mild irregularities and bifurcated into a fairly small PDA and large posterolateral branch without significant disease. There were three bypass grafts.   The left internal mammary to the LAD was nonfunctional and was very small and appeared underdeveloped. There did appear to be floating on the body of this graft, but it did not feel the LAD. The saphenous vein graft to the distal marginal branch was patent and free of significant disease. There was a 50% proximal area of narrowing in the body of this graft and the insertion site had no significant disease. The marginal branch was a distal marginal branch, which was moderate in size and had diffuse coarse irregularities. The Y saphenous vein graft to the ramus intermedius and diagonal vessels were patent. The limb of the saphenous vein graft to the diagonal was patent and free of significant disease. Noted to have large diagonal branch and there was backfilling of the LAD, which appeared to be a large vessel. The entire LAD filled via this diagonal branch and extended to the apex. There were no high-grade stenosis in the native LAD. This was beyond the occluded area in the proximal LAD. The branch of the Y graft to the ramus intermedius was filled with a large filling defect consistent with thrombus. The entire body of this graft appeared to be filled with thrombus and it attached to a moderate to large ramus intermedius vessel without apparent significant disease. There was flow down in this graft to the ramus intermedius vessel.     CONCLUSIONS:  1. Severe two-vessel native coronary artery disease involving the LAD, left circumflex and left main. Mild disease in the native right coronary artery. 2.  Nonfunctional left internal mammary graft to the LAD. 3.  Patent saphenous vein graft to the distal marginal vessel without significant disease. 4.  Patent saphenous vein Y graft to a large diagonal branch and the ramus intermedius vessel. The limb of this graft to the ramus intermedius had extensive filling defect present consistent with thrombus. 5.  Mild-to-moderate LV systolic dysfunction with wall motion abnormalities as described above.   6.  Normal resting LVEDP.    PCI by Dr. Meliton Dandy 8/28:  Prox Graft lesion (Sequential Graft to 1st Diag, Lat Ramus)    Thrombectomy    The clot was removed mechanically. Passes taken: 1. Total fluid removal: 10 mL. Total duration: 60 sec. Pre YAIR flow was 2. Supplies used: CATH THROMCTMY TBNG 5F 140CM -- PENUMBRA INDIGO CATRX; GDWIRE ANGIO CLEMENTE 180CM STR TIP -- ASAHI DANIELLE    Thrombectomy    The clot was removed mechanically. Passes taken: 1. Total fluid removal: 10 mL. Total duration: 30 sec. Supplies used: CATH THROMCTMY TBNG 5F 140CM -- PENUMBRA INDIGO CATRX; GDWIRE ANGIO CLEMENTE 180CM STR TIP -- ASAHI DANIELLE    Post-Intervention Lesion Assessment       Prox Graft to Dist Graft lesion (Sequential Graft to 1st Diag, Lat Ramus)    Thrombectomy    The clot was removed mechanically. Passes taken: 2. Total fluid removal: 10 mL. Total duration: 60 sec. Pre YAIR flow was 1. Post YAIR flow is 3. Supplies used: GDWIRE COR STR ASAHI 0.284X425 -- ; CATH THROMCTMY TBNG 5F 140CM -- PENUMBRA INDIGO CATRX    Thrombectomy    The clot was removed mechanically. Passes taken: 1. Total fluid removal: 10 mL. Supplies used: CATH THROMCTMY TBNG 5F 140CM -- PENUMBRA INDIGO CATRX            Prior to Admission medications    Medication Sig Start Date End Date Taking? Authorizing Provider   amLODIPine (NORVASC) 10 mg tablet Take  by mouth daily. Yes Provider, Historical   omega 3-dha-epa-fish oil (FISH OIL) 100-160-1,000 mg cap Take  by mouth. Yes Provider, Historical   diclofenac (VOLTAREN) 1 % gel Apply  to affected area four (4) times daily. Yes Provider, Historical   losartan (COZAAR) 100 mg tablet Take 50 mg by mouth two (2) times a day. Yes Provider, Historical   Hydrochlorothiazide (HYDRODIURIL) 12.5 mg tablet Take 12.5 mg by mouth daily. Yes Provider, Historical   atorvastatin (LIPITOR) 20 mg tablet Take  by mouth daily. Yes Provider, Historical   Aspirin, Buffered 81 mg tab Take  by mouth daily.    Yes Provider, Historical     Recent Results (from the past 24 hour(s))   PTT    Collection Time: 08/30/20 12:19 PM   Result Value Ref Range    aPTT 64.4 (H) 22.1 - 32.0 sec    aPTT, therapeutic range     58.0 - 77.0 SECS   PTT    Collection Time: 08/30/20  6:19 PM   Result Value Ref Range    aPTT 59.3 (H) 22.1 - 32.0 sec    aPTT, therapeutic range     58.0 - 77.0 SECS   PTT    Collection Time: 08/31/20  6:51 AM   Result Value Ref Range    aPTT 49.1 (H) 22.1 - 32.0 sec    aPTT, therapeutic range     58.0 - 15.9 SECS   METABOLIC PANEL, BASIC    Collection Time: 08/31/20  6:51 AM   Result Value Ref Range    Sodium 141 136 - 145 mmol/L    Potassium 3.4 (L) 3.5 - 5.1 mmol/L    Chloride 111 (H) 97 - 108 mmol/L    CO2 25 21 - 32 mmol/L    Anion gap 5 5 - 15 mmol/L    Glucose 87 65 - 100 mg/dL    BUN 19 6 - 20 MG/DL    Creatinine 1.21 0.70 - 1.30 MG/DL    BUN/Creatinine ratio 16 12 - 20      GFR est AA >60 >60 ml/min/1.73m2    GFR est non-AA 59 (L) >60 ml/min/1.73m2    Calcium 7.9 (L) 8.5 - 10.1 MG/DL         Jayden Thakkar MD

## 2020-08-31 NOTE — PROGRESS NOTES
Problem: Falls - Risk of  Goal: *Absence of Falls  Description: Document Allen Welch Fall Risk and appropriate interventions in the flowsheet.   Outcome: Progressing Towards Goal  Note: Fall Risk Interventions:            Medication Interventions: Assess postural VS orthostatic hypotension, Evaluate medications/consider consulting pharmacy, Patient to call before getting OOB, Teach patient to arise slowly                   Problem: Patient Education: Go to Patient Education Activity  Goal: Patient/Family Education  Outcome: Progressing Towards Goal     Problem: Patient Education: Go to Patient Education Activity  Goal: Patient/Family Education  Outcome: Progressing Towards Goal     Problem: Unstable angina/NSTEMI: Day of Admission/Day 1  Goal: Off Pathway (Use only if patient is Off Pathway)  Outcome: Progressing Towards Goal  Goal: Activity/Safety  Outcome: Progressing Towards Goal  Goal: Consults, if ordered  Outcome: Progressing Towards Goal  Goal: Diagnostic Test/Procedures  Outcome: Progressing Towards Goal  Goal: Nutrition/Diet  Outcome: Progressing Towards Goal  Goal: Discharge Planning  Outcome: Progressing Towards Goal  Goal: Medications  Outcome: Progressing Towards Goal  Goal: Respiratory  Outcome: Progressing Towards Goal  Goal: Treatments/Interventions/Procedures  Outcome: Progressing Towards Goal  Goal: Psychosocial  Outcome: Progressing Towards Goal  Goal: *Hemodynamically stable  Outcome: Progressing Towards Goal  Goal: *Optimal pain control at patient's stated goal  Outcome: Progressing Towards Goal  Goal: *Lungs clear or at baseline  Outcome: Progressing Towards Goal     Problem: Unstable Angina/NSTEMI: Discharge Outcomes  Goal: *Hemodynamically stable  Outcome: Progressing Towards Goal  Goal: *Stable cardiac rhythm  Outcome: Progressing Towards Goal  Goal: *Lungs clear or at baseline  Outcome: Progressing Towards Goal  Goal: *Optimal pain control at patient's stated goal  Outcome: Progressing Towards Goal  Goal: *Identifies cardiac risk factors  Outcome: Progressing Towards Goal  Goal: *Verbalizes home exercise program, activity guidelines, cardiac precautions  Outcome: Progressing Towards Goal  Goal: *Verbalizes understanding and describes prescribed diet  Outcome: Progressing Towards Goal  Goal: *Verbalizes name, dosage, time, side effects, and number of days to continue medications  Outcome: Progressing Towards Goal  Goal: *Anxiety reduced or absent  Outcome: Progressing Towards Goal  Goal: *Understands and describes signs and symptoms to report to providers(Stroke Metric)  Outcome: Progressing Towards Goal  Goal: *Describes follow-up/return visits to physicians  Outcome: Progressing Towards Goal  Goal: *Describes available resources and support systems  Outcome: Progressing Towards Goal  Goal: *Influenza immunization  Outcome: Progressing Towards Goal  Goal: *Pneumococcal immunization  Outcome: Progressing Towards Goal  Goal: *Describes smoking cessation resources  Outcome: Progressing Towards Goal     Problem: Pain  Goal: *Control of Pain  Outcome: Progressing Towards Goal  Goal: *PALLIATIVE CARE:  Alleviation of Pain  Outcome: Progressing Towards Goal     Problem: Patient Education: Go to Patient Education Activity  Goal: Patient/Family Education  Outcome: Progressing Towards Goal     Problem: Pressure Injury - Risk of  Goal: *Prevention of pressure injury  Description: Document Edwin Scale and appropriate interventions in the flowsheet.   Outcome: Progressing Towards Goal     Problem: Patient Education: Go to Patient Education Activity  Goal: Patient/Family Education  Outcome: Progressing Towards Goal     Problem: Patient Education: Go to Patient Education Activity  Goal: Patient/Family Education  Outcome: Progressing Towards Goal     Problem: Cath Lab Procedures: Post-Cath Day 1  Goal: Off Pathway (Use only if patient is Off Pathway)  Outcome: Progressing Towards Goal  Goal: Activity/Safety  Outcome: Progressing Towards Goal  Goal: Diagnostic Test/Procedures  Outcome: Progressing Towards Goal  Goal: Nutrition/Diet  Outcome: Progressing Towards Goal  Goal: Discharge Planning  Outcome: Progressing Towards Goal  Goal: Medications  Outcome: Progressing Towards Goal  Goal: Respiratory  Outcome: Progressing Towards Goal  Goal: Treatments/Interventions/Procedures  Outcome: Progressing Towards Goal  Goal: Psychosocial  Outcome: Progressing Towards Goal     Problem: Patient Education: Go to Patient Education Activity  Goal: Patient/Family Education  Outcome: Progressing Towards Goal     Problem: Cath Lab Procedures: Pre-Procedure  Goal: Off Pathway (Use only if patient is Off Pathway)  Outcome: Progressing Towards Goal  Goal: Activity/Safety  Outcome: Progressing Towards Goal  Goal: Consults, if ordered  Outcome: Progressing Towards Goal  Goal: Diagnostic Test/Procedures  Outcome: Progressing Towards Goal  Goal: Nutrition/Diet  Outcome: Progressing Towards Goal  Goal: Discharge Planning  Outcome: Progressing Towards Goal  Goal: Medications  Outcome: Progressing Towards Goal  Goal: Respiratory  Outcome: Progressing Towards Goal  Goal: Treatments/Interventions/Procedures  Outcome: Progressing Towards Goal  Goal: Psychosocial  Outcome: Progressing Towards Goal  Goal: *Verbalize description of procedure  Outcome: Progressing Towards Goal  Goal: *Consent signed  Outcome: Progressing Towards Goal

## 2020-08-31 NOTE — PROGRESS NOTES
Bedside shift change report given to KELLY Raya (oncoming nurse) by Brooke Starr RN (offgoing nurse). Report included the following information SBAR, Kardex, Procedure Summary, Intake/Output, MAR, Accordion, Recent Results, Med Rec Status, Cardiac Rhythm NSR, BBB, Paced, Alarm Parameters , Pre Procedure Checklist, Procedure Verification, Quality Measures and Dual Neuro Assessment.      Pt. A&O x4, asymptomatic and stable vital signs. Right groin cath site dressing intact, no bleeding and hematoma.

## 2020-08-31 NOTE — PROGRESS NOTES
Bedside shift change report given to Sapphire Cat (oncoming nurse) by Dasilva RN (offgoing nurse). Report included the following information SBAR, Kardex, Procedure Summary, Intake/Output, MAR, Accordion, Recent Results, Med Rec Status, Cardiac Rhythm NSR, BBB, Paced, Alarm Parameters , Pre Procedure Checklist, Procedure Verification, Quality Measures and Dual Neuro Assessment.      Pt. A&O x4, asymptomatic and stable vital signs. Right groin cath site dressing intact, no bleeding and hematoma.

## 2020-08-31 NOTE — PROCEDURES
Angiography of the SVG  Y - graft supplying the Diagonal - LAD and the the Circumflex marginal .   Graft from 2004      Pt with acute NSTEMI last week ,  Treated in Cath lab on 8/28/20     Large thrombus burden in the SVG to Cx Marginal , initially treated with Penumbra Aspiration of thrombus. Significant residual thrombus burden . Additionally thrombus developed in the main portion of the graft and in the limb supplying the Diagonal and LAD . Treated with similar aspiration with Penumbra. No PTCA or Stenting was performed . No significant stenosis was identified. Patient remained on IV Integrelin, IV Heparin and was started on Brilinta. and ASA . Brought back today to reassess grafts with possible further intervention . Angiography shows resolution of the thrombus in the grafts. The limb supplying the Marginal is a very ectatic graft , but there was no identified stenosis that would necessitate PTCA or Stenting  . Best not to introduce a stent into this milieu , which might precipitate further thrombus. Will d/c Heparin,   Maintain pt on Brilinta and ASA . R Radial access.

## 2020-08-31 NOTE — PROGRESS NOTES
I reviewed pertinent labs and imaging, and discussed /agreed on the plan of care with Dr. Walter Dukes. Hospitalist Progress Note    NAME: Dell Chatterjee   :  1950   MRN:  [id number]     Assessment / Plan:  NSTEMI, POA   CAD, s/p CABG in   · S/p cardiac cath yesterday showing severe two-vessel native coronary artery disease involving the LAD, left circumflex and left main. Mild disease in the native right coronary artery, nonfunctional left internal mammary graft to the LAD. · Extensive thrombus found on cath, continue Heparin IV and plan for re-cath   · Scheduled for cath at 1230 today with cardiology   · Continue statin and ASA  · Continue Brilinta   · Continue nitro gtt   · Appreciate cardiology input     HTN  · Continue amlodipine     PAF  Hypercoagulable state secondary to afib  · ECHO with EF 50-55%     25.0 - 29.9 Overweight / Body mass index is 26.84 kg/m². Code status: Full  Prophylaxis: Heparin gtt  Recommended Disposition: Home w/Family     Subjective:     Chief Complaint / Reason for Physician Visit  Patient sitting up in chair. Wife at bedside. No new complaints. Discussed with RN events overnight. Review of Systems:  Symptom Y/N Comments  Symptom Y/N Comments   Fever/Chills n   Chest Pain n    Poor Appetite n   Edema n    Cough n   Abdominal Pain n    Sputum n   Joint Pain n    SOB/LARSON n   Pruritis/Rash n    Nausea/vomit n   Tolerating PT/OT     Diarrhea n   Tolerating Diet     Constipation n   Other       Could NOT obtain due to:      Objective:     VITALS:   Last 24hrs VS reviewed since prior progress note.  Most recent are:  Patient Vitals for the past 24 hrs:   Temp Pulse Resp BP SpO2   20 0834    105/49    20 0732 97.4 °F (36.3 °C) 61 16 96/50 90 %   20 0440 98.3 °F (36.8 °C) 64 18 100/46 94 %   20 2303 98.1 °F (36.7 °C) 65 18 107/53 97 %   20 1943 97.9 °F (36.6 °C) 74 16 134/60 98 %   20 1446 98.2 °F (36.8 °C) 70 16 100/43 98 %   20 1053 98.2 °F (36.8 °C) 65 18 101/54 97 %       Intake/Output Summary (Last 24 hours) at 8/31/2020 1043  Last data filed at 8/31/2020 0300  Gross per 24 hour   Intake 340 ml   Output 600 ml   Net -260 ml        PHYSICAL EXAM:  General: No acute distress. Pleasant elderly male. EENT:  EOMI. Anicteric sclerae. MMM  Resp:  Lung sounds clear. No accessory muscle use  CV:  Regular rate rhythm,  1+ BLE edema  GI:  Soft, Non distended, Non tender.  +Bowel sounds  Neurologic:  Alert and oriented X 3, normal speech,   Psych:   Good insight. Not anxious nor agitated  Skin:  No rashes. No jaundice    Reviewed most current lab test results and cultures  YES  Reviewed most current radiology test results   YES  Review and summation of old records today    NO  Reviewed patient's current orders and MAR    YES  PMH/SH reviewed - no change compared to H&P  ________________________________________________________________________  Care Plan discussed with:    Comments   Patient x    Family  x Wife at bedside   RN x    Care Manager     Consultant                        Multidiciplinary team rounds were held today with , nursing, pharmacist and clinical coordinator. Patient's plan of care was discussed; medications were reviewed and discharge planning was addressed. ________________________________________________________________________  Total NON critical care TIME:  26   Minutes    Total CRITICAL CARE TIME Spent:   Minutes non procedure based      Comments   >50% of visit spent in counseling and coordination of care     ________________________________________________________________________  Rosalita Ades, NP     Procedures: see electronic medical records for all procedures/Xrays and details which were not copied into this note but were reviewed prior to creation of Plan. LABS:  I reviewed today's most current labs and imaging studies.   Pertinent labs include:  Recent Labs     08/29/20  0629   WBC 8.1 HGB 11.4*   HCT 34.4*        Recent Labs     08/31/20  0651 08/30/20  0355 08/29/20  0031    139 141   K 3.4* 3.5 3.8   * 111* 111*   CO2 25 24 25   GLU 87 86 109*   BUN 19 19 20   CREA 1.21 1.27 1.18   CA 7.9* 7.7* 8.1*       Signed: Frankie Guerra NP

## 2020-08-31 NOTE — PROGRESS NOTES
Bedside and Verbal shift change report GIVEN TO Kelly Luis RN. Report included the following information SBAR, Kardex, ED Summary, Procedure Summary, Intake/Output, MAR and Recent Results. 9: lab states PTT/BMP being processed now. 938: PTT just resulted   941: rn spoke to [place] pharmacist, who states increase rate from 10u to 12u and give 2000 unit bolus. 1106: alexy in pharmacy states hep and nitro are y-site compatible.

## 2020-09-01 VITALS
WEIGHT: 192.46 LBS | HEART RATE: 63 BPM | HEIGHT: 71 IN | OXYGEN SATURATION: 95 % | RESPIRATION RATE: 16 BRPM | DIASTOLIC BLOOD PRESSURE: 52 MMHG | BODY MASS INDEX: 26.94 KG/M2 | TEMPERATURE: 98 F | SYSTOLIC BLOOD PRESSURE: 112 MMHG

## 2020-09-01 PROBLEM — I20.0 UNSTABLE ANGINA (HCC): Status: RESOLVED | Noted: 2020-08-28 | Resolved: 2020-09-01

## 2020-09-01 LAB
ANION GAP SERPL CALC-SCNC: 4 MMOL/L (ref 5–15)
BUN SERPL-MCNC: 16 MG/DL (ref 6–20)
BUN/CREAT SERPL: 13 (ref 12–20)
CALCIUM SERPL-MCNC: 8.1 MG/DL (ref 8.5–10.1)
CHLORIDE SERPL-SCNC: 113 MMOL/L (ref 97–108)
CO2 SERPL-SCNC: 25 MMOL/L (ref 21–32)
CREAT SERPL-MCNC: 1.19 MG/DL (ref 0.7–1.3)
GLUCOSE SERPL-MCNC: 83 MG/DL (ref 65–100)
POTASSIUM SERPL-SCNC: 3.5 MMOL/L (ref 3.5–5.1)
SODIUM SERPL-SCNC: 142 MMOL/L (ref 136–145)

## 2020-09-01 PROCEDURE — 74011250637 HC RX REV CODE- 250/637: Performed by: INTERNAL MEDICINE

## 2020-09-01 PROCEDURE — 80048 BASIC METABOLIC PNL TOTAL CA: CPT

## 2020-09-01 PROCEDURE — 36415 COLL VENOUS BLD VENIPUNCTURE: CPT

## 2020-09-01 RX ORDER — PANTOPRAZOLE SODIUM 40 MG/1
40 TABLET, DELAYED RELEASE ORAL
Qty: 30 TAB | Refills: 0 | Status: SHIPPED | OUTPATIENT
Start: 2020-09-02 | End: 2020-10-12

## 2020-09-01 RX ORDER — LOSARTAN POTASSIUM 50 MG/1
50 TABLET ORAL DAILY
Qty: 30 TAB | Refills: 0 | Status: SHIPPED | OUTPATIENT
Start: 2020-09-01

## 2020-09-01 RX ORDER — LOSARTAN POTASSIUM 50 MG/1
50 TABLET ORAL DAILY
Status: DISCONTINUED | OUTPATIENT
Start: 2020-09-01 | End: 2020-09-01 | Stop reason: HOSPADM

## 2020-09-01 RX ORDER — METOPROLOL SUCCINATE 25 MG/1
25 TABLET, EXTENDED RELEASE ORAL DAILY
Qty: 30 TAB | Refills: 0 | Status: SHIPPED | OUTPATIENT
Start: 2020-09-01

## 2020-09-01 RX ORDER — METOPROLOL SUCCINATE 25 MG/1
25 TABLET, EXTENDED RELEASE ORAL DAILY
Status: DISCONTINUED | OUTPATIENT
Start: 2020-09-01 | End: 2020-09-01 | Stop reason: HOSPADM

## 2020-09-01 RX ORDER — ATORVASTATIN CALCIUM 40 MG/1
40 TABLET, FILM COATED ORAL DAILY
Qty: 30 TAB | Refills: 0 | Status: SHIPPED | OUTPATIENT
Start: 2020-09-01

## 2020-09-01 RX ADMIN — ASPIRIN 81 MG CHEWABLE TABLET 81 MG: 81 TABLET CHEWABLE at 08:39

## 2020-09-01 RX ADMIN — TICAGRELOR 90 MG: 90 TABLET ORAL at 09:56

## 2020-09-01 RX ADMIN — Medication 10 ML: at 06:00

## 2020-09-01 RX ADMIN — PANTOPRAZOLE SODIUM 40 MG: 40 TABLET, DELAYED RELEASE ORAL at 08:39

## 2020-09-01 RX ADMIN — APIXABAN 2.5 MG: 2.5 TABLET, FILM COATED ORAL at 11:18

## 2020-09-01 RX ADMIN — METOPROLOL SUCCINATE 25 MG: 25 TABLET, EXTENDED RELEASE ORAL at 11:18

## 2020-09-01 RX ADMIN — AMLODIPINE BESYLATE 5 MG: 5 TABLET ORAL at 08:39

## 2020-09-01 NOTE — CARDIO/PULMONARY
Cardiac Rehab Note: chart review  
  
Consult has been acknowledged  
  
NSTEMI, cath with stent 8/28/20 
  
Smoking history assessed. Patient is a non smoker. Smoking Cessation Program link has not been added to the AVS.  
  
Echo 50-55% 8/31/20. 
  
MI education folder, with heart heathy diet, warning signs, heart facts, catheterization brochure, and out patient cardiac rehab program to bedside of Haim Last.   
  
Pt asking appropriate questions regarding out-patient Cardiac Rehab program.  He has his own treadmill at home and is undecided regarding enrollment. Concerns addressed and contact information provided. Pt request some time to consider program.  Advised of the [facility] [place] recommendations of 30 minutes of moderate intensity aerobic activity x5 days/week, heart healthy diet information reviewed with pt. Pt and wife verbalized understanding.  Pt will be placed on call back list.

## 2020-09-01 NOTE — PROGRESS NOTES
Chart review. Anticipate discharge today. Eliquis and Brillinta cards provided to patient. STEFANI  Home  Follow up appointments  NStemi Called to Care Card provided    Patient informed CM Security has his wallet. Nursing informed. CM tasks completed for discharge planning. D/C order pending. 1116am  Discharge order acknowledged. CM tasks complete. Nursing informed.      Delmis Rivero RN CM  Ext [phone]

## 2020-09-01 NOTE — DISCHARGE SUMMARY
Hospitalist Discharge Summary     Patient ID:  Genny Contreras  [id number]  36 y.o.  1950  8/28/2020    PCP on record: Robyn Raines MD    Admit date: 8/28/2020  Discharge date and time: 9/1/2020    DISCHARGE DIAGNOSIS:    NSTEMI    CONSULTATIONS:  IP CONSULT TO HOSPITALIST  IP CONSULT TO CARDIOLOGY    Excerpted HPI from H&P of Loan Townsend MD:    Dell Grimaldo a 65 y. o. male, with significant pmhx of hypertension, CAD, with a [place] presents via EMS as a transfer from [address] [place] [address] ED to [facility] ED with report of unstable angina. Oly Allan reports having sudden onset of chest pain/presure that was midsternal nonradiating at 7 PM yesterday evening.  Patient presented to outside hospital with negative troponin and negative chest x-ray.  Patient was given 3 sublingual nitro and morphine with noted resolution of his chest pain.  Patient without evidence of STEMI on EKG or posterior EKG.  Patient was initially directly admitted to the hospitalist service but due to lack of inpatient beds available patient was transferred to our ER for admission.  Pt also specifically denies any recent fevers, chills, SOB, nausea, vomiting, diarrhea, abd pain, changes in BM, urinary sxs, or headache.      Time of his arrival to our emergency department patient reports having much improvement of his previously noted pain although still somewhat present with a pressure type sensation.  Patient declines analgesic offered to him at this time. ______________________________________________________________________  DISCHARGE SUMMARY/HOSPITAL COURSE:  for full details see H&P, daily progress notes, labs, consult notes. Patient 65years old man with past medical history significant for coronary artery disease status post CABG 2004 was admitted to the hospital with acute coronary syndrome/NSTEMI. Angiography revealed no new coronary artery lesion.   Patient had a recent stent revealed severe two-vessel native coronary artery disease. Cardiology evaluated the patient and recommended discharging him on aspirin, Brilinta, Eliquis, statin and follow-up as an outpatient with him. Patient has been chest pain-free for more than 48 hours. _______________________________________________________________________  Patient seen and examined by me on discharge day. Pertinent Findings:  Gen:    Not in distress  Chest: Clear lungs  CVS:   Regular rhythm. No edema  Abd:  Soft, not distended, not tender  Neuro:  Alert.  _______________________________________________________________________  DISCHARGE MEDICATIONS:   Discharge Medication List as of 9/1/2020 11:42 AM      START taking these medications    Details   apixaban (ELIQUIS) 2.5 mg tablet Take 1 Tab by mouth two (2) times a day., Normal, Disp-60 Tab,R-0      metoprolol succinate (TOPROL-XL) 25 mg XL tablet Take 1 Tab by mouth daily. , Normal, Disp-30 Tab,R-0      pantoprazole (PROTONIX) 40 mg tablet Take 1 Tab by mouth Daily (before breakfast). , Normal, Disp-30 Tab,R-0      ticagrelor (BRILINTA) 90 mg tablet Take 1 Tab by mouth two (2) times a day., Normal, Disp-60 Tab,R-0         CONTINUE these medications which have CHANGED    Details   aspirin,buffd-calcium carb-mag 81 mg tab Take 81 mg by mouth daily for 28 days. , Normal, Disp-30 Tab,R-0      atorvastatin (LIPITOR) 40 mg tablet Take 1 Tab by mouth daily. , Normal, Disp-30 Tab,R-0      losartan (COZAAR) 50 mg tablet Take 1 Tab by mouth daily. , Normal, Disp-30 Tab,R-0         CONTINUE these medications which have NOT CHANGED    Details   omega 3-dha-epa-fish oil (FISH OIL) 100-160-1,000 mg cap Take  by mouth., Historical Med      diclofenac (VOLTAREN) 1 % gel Apply  to affected area four (4) times daily. , Historical Med         STOP taking these medications       amLODIPine (NORVASC) 10 mg tablet Comments:   Reason for Stopping:         Hydrochlorothiazide (HYDRODIURIL) 12.5 mg tablet Comments: Reason for Stopping:                 Patient Follow Up Instructions: Activity: Activity as tolerated  Diet: Cardiac Diet  Wound Care: None needed      Follow-up Information     Follow up With Specialties Details Why Lenny Rodriguez MD Family Medicine Go on 9/9/2020 PCP -hospital follow up appointment at 9:45AM  [address] [address]  [address] [facility]      Lisette Johnson MD Cardiology On 9/14/2020 Cardiology - hospital follow up. Please keep you appointment as scheduled prior to admission.   [address]  [id number]  [facility] [address]  [phone]          ________________________________________________________________    Risk of deterioration: Low    Condition at Discharge:  Stable  __________________________________________________________________    Disposition  Home with family, no needs    ____________________________________________________________________    Code Status: Full Code  ___________________________________________________________________      Total time in minutes spent coordinating this discharge (includes going over instructions, follow-up, prescriptions, and preparing report for sign off to her PCP) :  35 minutes    Signed:  Odell Hinton MD

## 2020-09-01 NOTE — PROGRESS NOTES
8:34 PM Bedrest complete. Pt ambulated from the bed to the bathroom to void. Pt returned to the bedside commode. Pt reports no CP/SOB/change in gait. RR site CDI, no bleeding/hematoma. 0700: Bedside and Verbal shift change report given to day shift nurse Mariano Harvey (oncoming nurse) by Abram Austin RN (offgoing nurse). Report included the following information: SBAR, Kardex, Intake/Output, MAR, Procedural information, and Recent Results.

## 2020-09-01 NOTE — PROGRESS NOTES
Problem: Falls - Risk of  Goal: *Absence of Falls  Description: Document Devere Venice Fall Risk and appropriate interventions in the flowsheet.   Outcome: Progressing Towards Goal  Note: Fall Risk Interventions:  Mobility Interventions: Assess mobility with egress test, Patient to call before getting OOB         Medication Interventions: Evaluate medications/consider consulting pharmacy, Patient to call before getting OOB, Teach patient to arise slowly    Elimination Interventions: Call light in reach

## 2020-09-01 NOTE — PROGRESS NOTES
Cardiology Progress Note      9/1/2020 9:54 AM    Admit Date: 8/28/2020          Subjective:         Visit Vitals  /54   Pulse 73   Temp 98.4 °F (36.9 °C)   Resp 18   Ht 5' 11\" (1.803 m)   Wt 87.3 kg (192 lb 7.4 oz)   SpO2 96%   BMI 26.84 kg/m²     08/30 1901 - 09/01 0700  In: 340 [P.O.:340]  Out: 600 [Urine:600]        Objective:      Physical Exam:  VS as above  Chest CTA  Card RRR no changes     Data Review:   Labs:      BUN 16  Creat 1.2     Telemetry: SR R 65       Assessment:   1. Acute coronary syndrome, NSTEMI. Complicated PCI with heavy thrombus burden, resolved on repeat cath yest  2. mildy dresssed EF on cath   3. Mild to moderate AI   4. CAD s/p CABG in 2004   5. HTN  6. HLP  7. Chronic renal insufficiency stage 3  8. pAfib   9. 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 Chronic RBBB   11. Full code      Plan:  D/C Norvasc due to LE edema. Add Toprol and resume low dose losartan. He will be on ASA, Brilinta and low dose Eliquis but will stop ASA in 4 weeks.  He sees me on 9/14 and will keep that appt

## 2020-09-01 NOTE — PROGRESS NOTES
1009: Dr Phyllis Salas states hold [address] as pt received norvas this am.     Discharge instructions reviewed with pt to his satisfaction. Signed copy on pt's ppr chart and original given to pt. New and changed rxs sent to pt's pharmacy and pt made aware. Groin and femoral and radial post cath instructions given to pt and explained. Iv/tele removed. Pt d/c'd to home with spouse.

## 2020-09-01 NOTE — PROGRESS NOTES
2nd IM provided to patient. Chart copy signed and placed in chart.     Miranda Maurer RN CM  Ext [phone]

## 2020-09-02 LAB
ATRIAL RATE: 60 BPM
CALCULATED P AXIS, ECG09: 103 DEGREES
CALCULATED R AXIS, ECG10: 7 DEGREES
CALCULATED T AXIS, ECG11: 17 DEGREES
DIAGNOSIS, 93000: NORMAL
P-R INTERVAL, ECG05: 270 MS
Q-T INTERVAL, ECG07: 520 MS
QRS DURATION, ECG06: 146 MS
QTC CALCULATION (BEZET), ECG08: 520 MS
VENTRICULAR RATE, ECG03: 60 BPM

## 2020-10-12 ENCOUNTER — HOSPITAL ENCOUNTER (OUTPATIENT)
Dept: CARDIAC REHAB | Age: 70
Discharge: HOME | End: 2020-10-12
Payer: MEDICARE

## 2020-10-12 VITALS — WEIGHT: 183 LBS | BODY MASS INDEX: 25.62 KG/M2 | HEIGHT: 71 IN

## 2020-10-12 PROCEDURE — 93798 PHYS/QHP OP CAR RHAB W/ECG: CPT

## 2020-10-12 NOTE — CARDIO/PULMONARY
Cardiopulmonary Rehab Orientation:     Met with for the initial session. Mr. Naty Stuart is a 65year-old patient of Dr. Rei Noonan, who presents to rehab for cardiac conditioning and strengthening, S/P NSTEMI 8/28/2020. LVEF 55 %. History also includes HLD, HTN, Afib, and CAD, CABG. Allergies: none     Immunization for influenza vaccine UTD. Pt is nonsmoker. Lungs were CTA; denied any cough. No LE edema was present. Exercise at home includes walking, biking, and kayaking. Limitations: none. Patient was given an educational notebook. Psychosocial: Pt denies stress; He is  with caring support systems. He enjoys golf and  fishing for relaxation. Scored a 1 on PHQ 9 screening tool. BMI 25.5, based on height of 5'11\" and weight of 183 lbs. Pt completed 6MW on the treadmill without difficulty with 450 m. METS 3.2. Tele: NSR/ST, BBB     Patient's identified goals are:   1.  Complete 36 sessions of cardiac rehab

## 2020-10-14 ENCOUNTER — HOSPITAL ENCOUNTER (OUTPATIENT)
Dept: CARDIAC REHAB | Age: 70
Discharge: HOME | End: 2020-10-14
Payer: MEDICARE

## 2020-10-14 VITALS — WEIGHT: 181.4 LBS | BODY MASS INDEX: 25.3 KG/M2

## 2020-10-14 PROCEDURE — 93798 PHYS/QHP OP CAR RHAB W/ECG: CPT

## 2020-10-16 ENCOUNTER — HOSPITAL ENCOUNTER (OUTPATIENT)
Dept: CARDIAC REHAB | Age: 70
Discharge: HOME | End: 2020-10-16
Payer: MEDICARE

## 2020-10-16 VITALS — WEIGHT: 183.8 LBS | BODY MASS INDEX: 25.63 KG/M2

## 2020-10-16 PROCEDURE — 93798 PHYS/QHP OP CAR RHAB W/ECG: CPT

## 2020-10-19 ENCOUNTER — HOSPITAL ENCOUNTER (OUTPATIENT)
Dept: CARDIAC REHAB | Age: 70
Discharge: HOME | End: 2020-10-19
Payer: MEDICARE

## 2020-10-19 VITALS — BODY MASS INDEX: 25.44 KG/M2 | WEIGHT: 182.4 LBS

## 2020-10-19 PROCEDURE — 93798 PHYS/QHP OP CAR RHAB W/ECG: CPT

## 2020-10-21 ENCOUNTER — HOSPITAL ENCOUNTER (OUTPATIENT)
Dept: CARDIAC REHAB | Age: 70
Discharge: HOME | End: 2020-10-21
Payer: MEDICARE

## 2020-10-21 VITALS — WEIGHT: 182.8 LBS | BODY MASS INDEX: 25.5 KG/M2

## 2020-10-21 PROCEDURE — 93798 PHYS/QHP OP CAR RHAB W/ECG: CPT

## 2020-10-22 ENCOUNTER — HOSPITAL ENCOUNTER (OUTPATIENT)
Dept: CARDIAC REHAB | Age: 70
Discharge: HOME | End: 2020-10-22
Payer: MEDICARE

## 2020-10-22 VITALS — BODY MASS INDEX: 25.47 KG/M2 | WEIGHT: 182.6 LBS

## 2020-10-22 PROCEDURE — 93798 PHYS/QHP OP CAR RHAB W/ECG: CPT

## 2020-10-26 ENCOUNTER — APPOINTMENT (OUTPATIENT)
Dept: CARDIAC REHAB | Age: 70
End: 2020-10-26
Payer: MEDICARE

## 2020-10-27 ENCOUNTER — HOSPITAL ENCOUNTER (OUTPATIENT)
Dept: CARDIAC REHAB | Age: 70
Discharge: HOME | End: 2020-10-27
Payer: MEDICARE

## 2020-10-27 VITALS — BODY MASS INDEX: 26.28 KG/M2 | WEIGHT: 188.4 LBS

## 2020-10-29 ENCOUNTER — HOSPITAL ENCOUNTER (OUTPATIENT)
Dept: CARDIAC REHAB | Age: 70
Discharge: HOME | End: 2020-10-29
Payer: MEDICARE

## 2020-10-29 VITALS — BODY MASS INDEX: 25.61 KG/M2 | WEIGHT: 183.6 LBS

## 2020-10-29 PROCEDURE — 93798 PHYS/QHP OP CAR RHAB W/ECG: CPT

## 2020-11-02 ENCOUNTER — HOSPITAL ENCOUNTER (OUTPATIENT)
Dept: CARDIAC REHAB | Age: 70
Discharge: HOME | End: 2020-11-02
Payer: MEDICARE

## 2020-11-02 VITALS — WEIGHT: 182.6 LBS | BODY MASS INDEX: 25.47 KG/M2

## 2020-11-02 PROCEDURE — 93797 PHYS/QHP OP CAR RHAB WO ECG: CPT | Performed by: DIETITIAN, REGISTERED

## 2020-11-02 PROCEDURE — 93798 PHYS/QHP OP CAR RHAB W/ECG: CPT

## 2020-11-02 NOTE — PROGRESS NOTES
Cardiac Rehab Nutrition Assessment - 1:1 Evaluation       NAME: Jennie Colindres : 1950 AGE: 65 y.o. GENDER: male  CARDIAC REHAB ADMITTING DIAGNOSIS: NSTEMI    Relevant Comorbidites:HTN, dyslipidemia, CABG, CAD    LABS:   Lab Results   Component Value Date/Time    Hemoglobin A1c 5.5 2020 04:20 AM     Lab Results   Component Value Date/Time    Cholesterol, total 132 2020 04:20 AM    HDL Cholesterol 51 2020 04:20 AM    LDL, calculated 71.4 2020 04:20 AM    VLDL, calculated 9.6 2020 04:20 AM    Triglyceride 48 2020 04:20 AM    CHOL/HDL Ratio 2.6 2020 04:20 AM         MEDICATIONS/SUPPLEMENTS:   [unfilled]  Prior to Admission medications    Medication Sig Start Date End Date Taking? Authorizing Provider   atorvastatin (LIPITOR) 40 mg tablet Take 1 Tab by mouth daily. 20   Soco Crisostomo MD   losartan (COZAAR) 50 mg tablet Take 1 Tab by mouth daily. 20   Soco Crisostomo MD   apixaban (ELIQUIS) 2.5 mg tablet Take 1 Tab by mouth two (2) times a day. 20   Soco Crisostomo MD   metoprolol succinate (TOPROL-XL) 25 mg XL tablet Take 1 Tab by mouth daily. 20   Soco Crisostomo MD   ticagrelor (BRILINTA) 90 mg tablet Take 1 Tab by mouth two (2) times a day. 20   Soco Crisostomo MD   omega 3-dha-epa-fish oil (FISH OIL) 100-160-1,000 mg cap Take  by mouth. Provider, Historical   diclofenac (VOLTAREN) 1 % gel Apply  to affected area four (4) times daily. Provider, Historical       ANTHROPOMETRICS:    Ht Readings from Last 1 Encounters:   10/12/20 5' 11\" (1.803 m)      Wt Readings from Last 1 Encounters:   10/29/20 83.3 kg (183 lb 9.6 oz)      IBW:  # +/- 10%  %IBW: 106 % +/- 10%    BMI: 25.6 kg/M2 Category:  Overweight  Waist: 37.5 inches    Reported Wt Hx:  Wt Readings from Last 10 Encounters:   10/29/20 83.3 kg (183 lb 9.6 oz)   10/27/20 85.5 kg (188 lb 6.4 oz)   10/22/20 82.8 kg (182 lb 9.6 oz)   10/21/20 82.9 kg (182 lb 12.8 oz)   10/19/20 82.7 kg (182 lb 6.4 oz)   10/16/20 83.4 kg (183 lb 12.8 oz)   10/14/20 82.3 kg (181 lb 6.4 oz)   10/12/20 83 kg (183 lb)   08/31/20 87.3 kg (192 lb 7.4 oz)   03/03/17 86.2 kg (190 lb)     Reported Diet Hx:    Rate Your Plate Score:  (Score 58-72: Making many healthy choices; 41-57: Some choices need improving 24-40: many choices need improving)     24 Hour Diet Recall  Breakfast 2 eggs, 2 newman, biscuit or oatmeal or cheerios   Lunch Tuna or pimento or low sodium ham/swiss sandwich, chips   Dinner Spaghetti or chicken, veggies or veggie soup or chili   Snacks fruit   Beverages Water, sweet tea       Environmental/Social:  Retired salesman; works p-time. Wife does most of cooking      NUTRITION INTERVENTION:  Nutrition 60 minute one-on-one education & goal setting with Minda Mac    Reviewed with Minda Mac relevant labs compared to ideals. Reviewed weight history and patient's verbalized weight goal as well as any real or perceived barriers to obtaining the goal. Collaborated with patient to set a specific short and long term weight goal.     Reviewed Rate Your Plate and conducted a verbal diet recall. Assessed for environmental, financial, psychosocial, physical and comorbidities that may impact the food and eating patterns / behaviors of Ana Maria Laws with patient to set specific nutrient goals as well as specific food / behavior changes that will help patient meet the overall goal of following a heart healthy eating pattern (using guidelines as set forth by the American Heart Association and modeled after healthful eating patterns as recognized by the USDA Dietary Guidelines such as DASH, Mediterranean or plant-based). Briefly reviewed with Minda Mac the nutrition information in the Cardiac Rehab patient education book and encouraged Minda Mac to read thoroughly, ask questions as needed, and use for future reference for heart healthy nutrition information.       Minda Mac is scheduled to participate in Cardiac Rehab group nutrition classes. PATIENT GOALS:    Weight Goals: Wt maintenance    Nutrition Goals:  Daily Recommendations:  Calories: 5502-4910 /day  (using Johnny Leader with AF x1.2-1.3)    Saturated Fat: no more than 13 g/day  Trans Fat: 0 g/day  Sodium: no more than 8094-8168 mg/day  Fruit: 2 cups / day  Vegetables: 2-3 cups/day    Other:  1. Continue to read food labels for saturated fat and sodium and limit to above recommendations    Keeping a food diary was recommended. Questions addressed. Follow-up plans discussed. Kimberly Frost verbalized understanding.             Abbi Howard RD

## 2020-11-04 ENCOUNTER — HOSPITAL ENCOUNTER (OUTPATIENT)
Dept: CARDIAC REHAB | Age: 70
Discharge: HOME | End: 2020-11-04
Payer: MEDICARE

## 2020-11-04 VITALS — WEIGHT: 182.4 LBS | BODY MASS INDEX: 25.44 KG/M2

## 2020-11-04 PROCEDURE — 93798 PHYS/QHP OP CAR RHAB W/ECG: CPT

## 2020-11-05 ENCOUNTER — HOSPITAL ENCOUNTER (OUTPATIENT)
Dept: CARDIAC REHAB | Age: 70
Discharge: HOME | End: 2020-11-05
Payer: MEDICARE

## 2020-11-05 VITALS — BODY MASS INDEX: 25.38 KG/M2 | WEIGHT: 182 LBS

## 2020-11-05 PROCEDURE — 93798 PHYS/QHP OP CAR RHAB W/ECG: CPT

## 2020-11-09 ENCOUNTER — APPOINTMENT (OUTPATIENT)
Dept: CARDIAC REHAB | Age: 70
End: 2020-11-09
Payer: MEDICARE

## 2020-11-11 ENCOUNTER — HOSPITAL ENCOUNTER (OUTPATIENT)
Dept: CARDIAC REHAB | Age: 70
Discharge: HOME | End: 2020-11-11
Payer: MEDICARE

## 2020-11-11 VITALS — WEIGHT: 183.6 LBS | BODY MASS INDEX: 25.61 KG/M2

## 2020-11-11 PROCEDURE — 93798 PHYS/QHP OP CAR RHAB W/ECG: CPT

## 2020-11-12 ENCOUNTER — HOSPITAL ENCOUNTER (OUTPATIENT)
Dept: CARDIAC REHAB | Age: 70
Discharge: HOME | End: 2020-11-12
Payer: MEDICARE

## 2020-11-12 VITALS — BODY MASS INDEX: 25.57 KG/M2 | WEIGHT: 183.3 LBS

## 2020-11-12 PROCEDURE — 93798 PHYS/QHP OP CAR RHAB W/ECG: CPT

## 2020-11-16 ENCOUNTER — HOSPITAL ENCOUNTER (OUTPATIENT)
Dept: CARDIAC REHAB | Age: 70
Discharge: HOME | End: 2020-11-16
Payer: MEDICARE

## 2020-11-16 VITALS — WEIGHT: 182.6 LBS | BODY MASS INDEX: 25.47 KG/M2

## 2020-11-16 PROCEDURE — 93798 PHYS/QHP OP CAR RHAB W/ECG: CPT

## 2020-11-16 PROCEDURE — 93797 PHYS/QHP OP CAR RHAB WO ECG: CPT | Performed by: DIETITIAN, REGISTERED

## 2020-11-18 ENCOUNTER — HOSPITAL ENCOUNTER (OUTPATIENT)
Dept: CARDIAC REHAB | Age: 70
Discharge: HOME | End: 2020-11-18
Payer: MEDICARE

## 2020-11-18 VITALS — BODY MASS INDEX: 25.58 KG/M2 | WEIGHT: 183.4 LBS

## 2020-11-18 PROCEDURE — 93798 PHYS/QHP OP CAR RHAB W/ECG: CPT

## 2020-11-19 ENCOUNTER — HOSPITAL ENCOUNTER (OUTPATIENT)
Dept: CARDIAC REHAB | Age: 70
Discharge: HOME | End: 2020-11-19
Payer: MEDICARE

## 2020-11-19 VITALS — BODY MASS INDEX: 25.55 KG/M2 | WEIGHT: 183.2 LBS

## 2020-11-19 PROCEDURE — 93798 PHYS/QHP OP CAR RHAB W/ECG: CPT

## 2020-11-23 ENCOUNTER — HOSPITAL ENCOUNTER (OUTPATIENT)
Dept: CARDIAC REHAB | Age: 70
Discharge: HOME | End: 2020-11-23
Payer: MEDICARE

## 2020-11-23 VITALS — BODY MASS INDEX: 25.27 KG/M2 | WEIGHT: 181.2 LBS

## 2020-11-23 PROCEDURE — 93797 PHYS/QHP OP CAR RHAB WO ECG: CPT | Performed by: DIETITIAN, REGISTERED

## 2020-11-23 PROCEDURE — 93798 PHYS/QHP OP CAR RHAB W/ECG: CPT

## 2020-11-25 ENCOUNTER — HOSPITAL ENCOUNTER (OUTPATIENT)
Dept: CARDIAC REHAB | Age: 70
Discharge: HOME | End: 2020-11-25
Payer: MEDICARE

## 2020-11-25 VITALS — WEIGHT: 180.6 LBS | BODY MASS INDEX: 25.19 KG/M2

## 2020-11-25 PROCEDURE — 93798 PHYS/QHP OP CAR RHAB W/ECG: CPT

## 2020-11-30 ENCOUNTER — HOSPITAL ENCOUNTER (OUTPATIENT)
Dept: CARDIAC REHAB | Age: 70
Discharge: HOME | End: 2020-11-30
Payer: MEDICARE

## 2020-11-30 VITALS — WEIGHT: 182.6 LBS | BODY MASS INDEX: 25.47 KG/M2

## 2020-11-30 PROCEDURE — 93797 PHYS/QHP OP CAR RHAB WO ECG: CPT

## 2020-11-30 PROCEDURE — 93798 PHYS/QHP OP CAR RHAB W/ECG: CPT

## 2020-12-02 ENCOUNTER — HOSPITAL ENCOUNTER (OUTPATIENT)
Dept: CARDIAC REHAB | Age: 70
Discharge: HOME | End: 2020-12-02
Payer: MEDICARE

## 2020-12-02 VITALS — WEIGHT: 182.6 LBS | BODY MASS INDEX: 25.47 KG/M2

## 2020-12-02 PROCEDURE — 93798 PHYS/QHP OP CAR RHAB W/ECG: CPT

## 2020-12-07 ENCOUNTER — HOSPITAL ENCOUNTER (OUTPATIENT)
Dept: CARDIAC REHAB | Age: 70
Discharge: HOME | End: 2020-12-07
Payer: MEDICARE

## 2020-12-07 VITALS — WEIGHT: 183.6 LBS | BODY MASS INDEX: 25.61 KG/M2

## 2020-12-07 PROCEDURE — 93798 PHYS/QHP OP CAR RHAB W/ECG: CPT

## 2020-12-09 ENCOUNTER — APPOINTMENT (OUTPATIENT)
Dept: CARDIAC REHAB | Age: 70
End: 2020-12-09
Payer: MEDICARE

## 2020-12-10 ENCOUNTER — HOSPITAL ENCOUNTER (OUTPATIENT)
Dept: CARDIAC REHAB | Age: 70
Discharge: HOME | End: 2020-12-10
Payer: MEDICARE

## 2020-12-10 VITALS — BODY MASS INDEX: 25.66 KG/M2 | WEIGHT: 184 LBS

## 2020-12-10 PROCEDURE — 93798 PHYS/QHP OP CAR RHAB W/ECG: CPT

## 2020-12-14 ENCOUNTER — HOSPITAL ENCOUNTER (OUTPATIENT)
Dept: CARDIAC REHAB | Age: 70
Discharge: HOME | End: 2020-12-14
Payer: MEDICARE

## 2020-12-14 VITALS — WEIGHT: 183 LBS | BODY MASS INDEX: 25.52 KG/M2

## 2020-12-14 PROCEDURE — 93798 PHYS/QHP OP CAR RHAB W/ECG: CPT

## 2020-12-14 PROCEDURE — 93797 PHYS/QHP OP CAR RHAB WO ECG: CPT | Performed by: DIETITIAN, REGISTERED

## 2020-12-16 ENCOUNTER — HOSPITAL ENCOUNTER (OUTPATIENT)
Dept: CARDIAC REHAB | Age: 70
Discharge: HOME | End: 2020-12-16
Payer: MEDICARE

## 2020-12-16 PROCEDURE — 93798 PHYS/QHP OP CAR RHAB W/ECG: CPT

## 2020-12-17 ENCOUNTER — HOSPITAL ENCOUNTER (OUTPATIENT)
Dept: CARDIAC REHAB | Age: 70
Discharge: HOME | End: 2020-12-17
Payer: MEDICARE

## 2020-12-17 VITALS — BODY MASS INDEX: 25.52 KG/M2 | WEIGHT: 183 LBS

## 2020-12-17 PROCEDURE — 93798 PHYS/QHP OP CAR RHAB W/ECG: CPT

## 2020-12-21 ENCOUNTER — HOSPITAL ENCOUNTER (OUTPATIENT)
Dept: CARDIAC REHAB | Age: 70
Discharge: HOME | End: 2020-12-21
Payer: MEDICARE

## 2020-12-21 VITALS — WEIGHT: 184 LBS | BODY MASS INDEX: 25.66 KG/M2

## 2020-12-21 PROCEDURE — 93798 PHYS/QHP OP CAR RHAB W/ECG: CPT

## 2020-12-21 PROCEDURE — 93797 PHYS/QHP OP CAR RHAB WO ECG: CPT | Performed by: DIETITIAN, REGISTERED

## 2020-12-28 ENCOUNTER — HOSPITAL ENCOUNTER (OUTPATIENT)
Dept: CARDIAC REHAB | Age: 70
Discharge: HOME | End: 2020-12-28
Payer: MEDICARE

## 2020-12-28 VITALS — WEIGHT: 185 LBS | BODY MASS INDEX: 25.8 KG/M2

## 2020-12-28 PROCEDURE — 93797 PHYS/QHP OP CAR RHAB WO ECG: CPT

## 2020-12-30 ENCOUNTER — HOSPITAL ENCOUNTER (OUTPATIENT)
Dept: CARDIAC REHAB | Age: 70
End: 2020-12-30
Payer: MEDICARE

## 2021-01-04 ENCOUNTER — HOSPITAL ENCOUNTER (OUTPATIENT)
Dept: CARDIAC REHAB | Age: 71
Discharge: HOME | End: 2021-01-04
Payer: MEDICARE

## 2021-01-04 VITALS — BODY MASS INDEX: 25.8 KG/M2 | WEIGHT: 185 LBS

## 2021-01-04 PROCEDURE — 93798 PHYS/QHP OP CAR RHAB W/ECG: CPT

## 2023-05-11 RX ORDER — ATORVASTATIN CALCIUM 40 MG/1
TABLET, FILM COATED ORAL DAILY
COMMUNITY
Start: 2020-09-01

## 2023-05-11 RX ORDER — LOSARTAN POTASSIUM 50 MG/1
TABLET ORAL DAILY
COMMUNITY
Start: 2020-09-01

## 2023-05-11 RX ORDER — METOPROLOL SUCCINATE 25 MG/1
TABLET, EXTENDED RELEASE ORAL DAILY
COMMUNITY
Start: 2020-09-01

## 2023-11-06 ENCOUNTER — HOSPITAL ENCOUNTER (OUTPATIENT)
Facility: HOSPITAL | Age: 73
Discharge: HOME | End: 2023-11-09
Attending: ORTHOPAEDIC SURGERY
Payer: MEDICARE

## 2023-11-06 DIAGNOSIS — M75.121 NONTRAUMATIC COMPLETE TEAR OF RIGHT ROTATOR CUFF: ICD-10-CM

## 2023-11-06 PROCEDURE — 73040 CONTRAST X-RAY OF SHOULDER: CPT

## 2023-11-06 PROCEDURE — 6360000004 HC RX CONTRAST MEDICATION: Performed by: RADIOLOGY

## 2023-11-06 PROCEDURE — 73201 CT UPPER EXTREMITY W/DYE: CPT

## 2023-11-06 RX ADMIN — IOHEXOL 18 ML: 180 INJECTION INTRAVENOUS at 11:22

## (undated) DEVICE — CATHETER ETER CARD MULTIPAK MULTIPAK 5FR PERFORMA

## (undated) DEVICE — SPLINT WR POS F/ARTERIAL ACC -- BX/10

## (undated) DEVICE — CATHETER ANGIO JR4 AD 5 FRX100 CM 25 CM PERFORMA

## (undated) DEVICE — KIT ANGIOGRAPHY CUST MRMC

## (undated) DEVICE — HI-TORQUE VERSACORE FLOPPY GUIDE WIRE SYSTEM 145 CM: Brand: HI-TORQUE VERSACORE

## (undated) DEVICE — ANGIOGRAPHY KIT CUST [K0910930B] [MERIT MEDICAL SYSTEMS INC]

## (undated) DEVICE — ASSEMBLY CANSTR ENGINE -- INDIGO ASPIRATION SYSTEM

## (undated) DEVICE — CATHETER THROMCTMY L140CM RX L LUMN ASPIR TBNG INDIGO

## (undated) DEVICE — GUIDEWIRE VASC L145CM 0.035IN J TIP L3MM PTFE FIX COR NAMIC

## (undated) DEVICE — 3M™ TEGADERM™ TRANSPARENT FILM DRESSING FRAME STYLE, 1626W, 4 IN X 4-3/4 IN (10 CM X 12 CM), 50/CT 4CT/CASE: Brand: 3M™ TEGADERM™

## (undated) DEVICE — SYR POWER 150ML 8IN FILL TUBE --

## (undated) DEVICE — Device: Brand: ASAHI SION BLUE

## (undated) DEVICE — CUSTOM KT PTCA INFL DEV K05 00053H

## (undated) DEVICE — ANGIO-SEAL VIP VASCULAR CLOSURE DEVICE: Brand: ANGIO-SEAL

## (undated) DEVICE — DRESSING HEMOSTATIC SFT INTVENT W/O SLT DBL WRP QUIKCLOT LF

## (undated) DEVICE — PINNACLE INTRODUCER SHEATH: Brand: PINNACLE

## (undated) DEVICE — SYR ART 700 CLEAR MARK 7 -- ARTERION

## (undated) DEVICE — GUIDEWIRE VASC L180CM DIA0.035IN L7CM DIA3MM J TIP PTFE S

## (undated) DEVICE — Device: Brand: PROWATER

## (undated) DEVICE — GLIDESHEATH SLENDER ACCESS KIT: Brand: GLIDESHEATH SLENDER

## (undated) DEVICE — TUBING PRSS MON L6IN PVC M FEM CONN

## (undated) DEVICE — CATHETER ANGIO IM AD 5 FRX100 CM PERFORMA

## (undated) DEVICE — CATHETER ANGIO JL3.5 AD 5 FRX100 CM PERFORMA

## (undated) DEVICE — COMPR SYS HEMO FEMOSTOP GLD --

## (undated) DEVICE — GUIDEWIRE VASC L260CM 0.035IN J TIP L3MM PTFE FIX COR NAMIC

## (undated) DEVICE — TR BAND RADIAL ARTERY COMPRESSION DEVICE: Brand: TR BAND

## (undated) DEVICE — CATHETER ETER ANGIO L110CM OD5FR ID046IN L75CM 038IN 145DEG CARD

## (undated) DEVICE — SYRINGE ANGIO 10 CC BRL STD PRNT POLYCARB LT BLU MEDALLION

## (undated) DEVICE — KIT ACCS INTRO 4FR L10CM NDL 21GA L7CM GWIRE L40CM

## (undated) DEVICE — DEVICE EMBOLIC PROTCT FLTR L4MM GWIRE L320/190CM DIA0.014IN

## (undated) DEVICE — PACK PROCEDURE SURG HRT CATH

## (undated) DEVICE — CATH GUID COR AL10S 6FR 100CM -- LAUNCHER